# Patient Record
Sex: FEMALE | Race: WHITE | HISPANIC OR LATINO | URBAN - METROPOLITAN AREA
[De-identification: names, ages, dates, MRNs, and addresses within clinical notes are randomized per-mention and may not be internally consistent; named-entity substitution may affect disease eponyms.]

---

## 2018-05-23 ENCOUNTER — EMERGENCY (EMERGENCY)
Facility: HOSPITAL | Age: 28
LOS: 1 days | Discharge: HOME | End: 2018-05-23
Attending: EMERGENCY MEDICINE
Payer: COMMERCIAL

## 2018-05-23 VITALS
RESPIRATION RATE: 18 BRPM | DIASTOLIC BLOOD PRESSURE: 90 MMHG | HEART RATE: 93 BPM | TEMPERATURE: 98 F | SYSTOLIC BLOOD PRESSURE: 133 MMHG | OXYGEN SATURATION: 99 %

## 2018-05-23 VITALS — HEIGHT: 63 IN | WEIGHT: 111.99 LBS

## 2018-05-23 LAB
ALBUMIN SERPL ELPH-MCNC: 4.7 G/DL — SIGNIFICANT CHANGE UP (ref 3.3–5.2)
ALP SERPL-CCNC: 82 U/L — SIGNIFICANT CHANGE UP (ref 40–120)
ALT FLD-CCNC: 14 U/L — SIGNIFICANT CHANGE UP
ANION GAP SERPL CALC-SCNC: 16 MMOL/L — SIGNIFICANT CHANGE UP (ref 5–17)
AST SERPL-CCNC: 17 U/L — SIGNIFICANT CHANGE UP
BASOPHILS # BLD AUTO: 0 K/UL — SIGNIFICANT CHANGE UP (ref 0–0.2)
BASOPHILS NFR BLD AUTO: 0.4 % — SIGNIFICANT CHANGE UP (ref 0–2)
BILIRUB SERPL-MCNC: 0.3 MG/DL — LOW (ref 0.4–2)
BUN SERPL-MCNC: 11 MG/DL — SIGNIFICANT CHANGE UP (ref 8–20)
CALCIUM SERPL-MCNC: 9.7 MG/DL — SIGNIFICANT CHANGE UP (ref 8.6–10.2)
CHLORIDE SERPL-SCNC: 100 MMOL/L — SIGNIFICANT CHANGE UP (ref 98–107)
CO2 SERPL-SCNC: 24 MMOL/L — SIGNIFICANT CHANGE UP (ref 22–29)
CREAT SERPL-MCNC: 0.66 MG/DL — SIGNIFICANT CHANGE UP (ref 0.5–1.3)
EOSINOPHIL # BLD AUTO: 0.1 K/UL — SIGNIFICANT CHANGE UP (ref 0–0.5)
EOSINOPHIL NFR BLD AUTO: 1.4 % — SIGNIFICANT CHANGE UP (ref 0–6)
GLUCOSE SERPL-MCNC: 145 MG/DL — HIGH (ref 70–115)
HCT VFR BLD CALC: 40.7 % — SIGNIFICANT CHANGE UP (ref 37–47)
HGB BLD-MCNC: 13.6 G/DL — SIGNIFICANT CHANGE UP (ref 12–16)
LYMPHOCYTES # BLD AUTO: 1.6 K/UL — SIGNIFICANT CHANGE UP (ref 1–4.8)
LYMPHOCYTES # BLD AUTO: 29.1 % — SIGNIFICANT CHANGE UP (ref 20–55)
MCHC RBC-ENTMCNC: 31 PG — SIGNIFICANT CHANGE UP (ref 27–31)
MCHC RBC-ENTMCNC: 33.4 G/DL — SIGNIFICANT CHANGE UP (ref 32–36)
MCV RBC AUTO: 92.7 FL — SIGNIFICANT CHANGE UP (ref 81–99)
MONOCYTES # BLD AUTO: 0.4 K/UL — SIGNIFICANT CHANGE UP (ref 0–0.8)
MONOCYTES NFR BLD AUTO: 6.9 % — SIGNIFICANT CHANGE UP (ref 3–10)
NEUTROPHILS # BLD AUTO: 3.4 K/UL — SIGNIFICANT CHANGE UP (ref 1.8–8)
NEUTROPHILS NFR BLD AUTO: 61.8 % — SIGNIFICANT CHANGE UP (ref 37–73)
PLATELET # BLD AUTO: 247 K/UL — SIGNIFICANT CHANGE UP (ref 150–400)
POTASSIUM SERPL-MCNC: 4.5 MMOL/L — SIGNIFICANT CHANGE UP (ref 3.5–5.3)
POTASSIUM SERPL-SCNC: 4.5 MMOL/L — SIGNIFICANT CHANGE UP (ref 3.5–5.3)
PROT SERPL-MCNC: 8.4 G/DL — SIGNIFICANT CHANGE UP (ref 6.6–8.7)
RBC # BLD: 4.39 M/UL — LOW (ref 4.4–5.2)
RBC # FLD: 11.6 % — SIGNIFICANT CHANGE UP (ref 11–15.6)
SODIUM SERPL-SCNC: 140 MMOL/L — SIGNIFICANT CHANGE UP (ref 135–145)
WBC # BLD: 5.5 K/UL — SIGNIFICANT CHANGE UP (ref 4.8–10.8)
WBC # FLD AUTO: 5.5 K/UL — SIGNIFICANT CHANGE UP (ref 4.8–10.8)

## 2018-05-23 PROCEDURE — 85027 COMPLETE CBC AUTOMATED: CPT

## 2018-05-23 PROCEDURE — 80053 COMPREHEN METABOLIC PANEL: CPT

## 2018-05-23 PROCEDURE — 36415 COLL VENOUS BLD VENIPUNCTURE: CPT

## 2018-05-23 PROCEDURE — 96375 TX/PRO/DX INJ NEW DRUG ADDON: CPT

## 2018-05-23 PROCEDURE — 99284 EMERGENCY DEPT VISIT MOD MDM: CPT

## 2018-05-23 PROCEDURE — 96374 THER/PROPH/DIAG INJ IV PUSH: CPT

## 2018-05-23 PROCEDURE — 70486 CT MAXILLOFACIAL W/O DYE: CPT | Mod: 26

## 2018-05-23 PROCEDURE — 70486 CT MAXILLOFACIAL W/O DYE: CPT

## 2018-05-23 PROCEDURE — 99284 EMERGENCY DEPT VISIT MOD MDM: CPT | Mod: 25

## 2018-05-23 RX ORDER — IBUPROFEN 200 MG
1 TABLET ORAL
Qty: 30 | Refills: 0
Start: 2018-05-23 | End: 2018-05-27

## 2018-05-23 RX ORDER — KETOROLAC TROMETHAMINE 30 MG/ML
15 SYRINGE (ML) INJECTION ONCE
Refills: 0 | Status: DISCONTINUED | OUTPATIENT
Start: 2018-05-23 | End: 2018-05-23

## 2018-05-23 RX ADMIN — Medication 100 MILLIGRAM(S): at 11:30

## 2018-05-23 RX ADMIN — Medication 15 MILLIGRAM(S): at 11:31

## 2018-05-23 NOTE — ED PROVIDER NOTE - OBJECTIVE STATEMENT
28 year old female with a past hx of cellulitis of the foot and eye requiring antibiotic treatment, coming in complaining of right eye swelling, redness and pain since Monday, went to [facility] was given Amoxicillin for what they said was a preorbital cellulites, pt has taken only 1 day of antibiotic, states that the pain was mostly on the superior aspect of the eye, now states that the pain feels deeper in the eye and more inferiorly. Pt states that the swelling is increasing as well. denies blurry vision or double vision, but difficulty seeing secondary to swelling. pt denies FB sensation or trauma to the eye. has some crusting to the eye but states that she was trying to clean it but hurts to touch the eye . Pt does wear corrective eye lenses. Denies fever or chills, N/V/D/ abdominal pains, CP/SOb.

## 2018-05-23 NOTE — ED PROVIDER NOTE - CARE PLAN
Principal Discharge DX:	Cellulitis of eyelid, right  Assessment and plan of treatment:	18 year old female with redness, swelling and pain of the right eye.   ? periorbital vs orbital cellulitis.  labs, CT orbit and sinus

## 2018-05-23 NOTE — ED ADULT NURSE NOTE - OBJECTIVE STATEMENT
pt presents to ED with redness/swelling and pain to right orbital. pt states she was seen by her PMD yesterday and was told she has cellulitis to eye. pt given antibiotics with no relief. pt states the swelling and pain worsened since yesterday. afebrile. pt unable to open eye secondary to swelling.  a&ox3. breathing is even and unlabored. will contineu to monitor and reassess

## 2018-05-23 NOTE — ED PROVIDER NOTE - ATTENDING CONTRIBUTION TO CARE
seen with acp  c/o right eyelid erythema with swelling and redness no fever no chills no nausea no vomiting hx of cellulitis   PE marked swelling right eyelid   with pain  ct scan to r/o septal cellulitis and sinusitis were negative  started on clindamycin  patient improved while here  Agree with acps  assessment hx and physical

## 2018-05-23 NOTE — ED PROVIDER NOTE - PLAN OF CARE
18 year old female with redness, swelling and pain of the right eye.   ? periorbital vs orbital cellulitis.  labs, CT orbit and sinus

## 2018-05-23 NOTE — ED PROVIDER NOTE - CONSTITUTIONAL, MLM
normal... Well appearing, well nourished, awake, alert, oriented to person, place, time/situation and in no apparent physical or respiratory distress.

## 2018-05-23 NOTE — ED PROVIDER NOTE - PROGRESS NOTE DETAILS
pt received clindamycin IV instructed to continue to take clindamycin PO and FU with optho and PMD. educated if symptoms worsen to return to the ER  EYE swelling decreased significantlY!

## 2018-05-23 NOTE — ED PROVIDER NOTE - PHYSICAL EXAMINATION
RIGHT EYE: superior lid swelling and erythema. + TTP over superior lid and inferior orbit. no crepitus.  90 % closed. eye lid crusting.  sclera injected. GEGE. EOMI-pain with left lateral movement. no nystagmus.

## 2018-07-20 ENCOUNTER — APPOINTMENT (OUTPATIENT)
Dept: ANTEPARTUM | Facility: CLINIC | Age: 28
End: 2018-07-20

## 2018-07-20 ENCOUNTER — APPOINTMENT (OUTPATIENT)
Dept: MATERNAL FETAL MEDICINE | Facility: CLINIC | Age: 28
End: 2018-07-20

## 2018-07-24 ENCOUNTER — MED ADMIN CHARGE (OUTPATIENT)
Age: 28
End: 2018-07-24

## 2018-07-31 PROBLEM — L03.119 CELLULITIS, LEG: Status: RESOLVED | Noted: 2018-07-31 | Resolved: 2018-07-31

## 2018-07-31 PROBLEM — Z81.8 FAMILY HISTORY OF BIPOLAR DISORDER: Status: ACTIVE | Noted: 2018-07-31

## 2018-07-31 PROBLEM — Z83.49 FAMILY HISTORY OF HYPOTHYROIDISM: Status: ACTIVE | Noted: 2018-07-31

## 2018-07-31 PROBLEM — Z83.3 FAMILY HISTORY OF DIABETES MELLITUS: Status: ACTIVE | Noted: 2018-07-31

## 2018-08-01 ENCOUNTER — APPOINTMENT (OUTPATIENT)
Dept: ANTEPARTUM | Facility: CLINIC | Age: 28
End: 2018-08-01

## 2018-08-01 ENCOUNTER — ASOB RESULT (OUTPATIENT)
Age: 28
End: 2018-08-01

## 2018-08-01 ENCOUNTER — APPOINTMENT (OUTPATIENT)
Dept: MATERNAL FETAL MEDICINE | Facility: CLINIC | Age: 28
End: 2018-08-01
Payer: COMMERCIAL

## 2018-08-01 ENCOUNTER — APPOINTMENT (OUTPATIENT)
Dept: ANTEPARTUM | Facility: CLINIC | Age: 28
End: 2018-08-01
Payer: COMMERCIAL

## 2018-08-01 VITALS
DIASTOLIC BLOOD PRESSURE: 68 MMHG | RESPIRATION RATE: 16 BRPM | WEIGHT: 117.25 LBS | SYSTOLIC BLOOD PRESSURE: 104 MMHG | HEART RATE: 83 BPM | HEIGHT: 63 IN | OXYGEN SATURATION: 99 % | BODY MASS INDEX: 20.77 KG/M2

## 2018-08-01 DIAGNOSIS — Z82.49 FAMILY HISTORY OF ISCHEMIC HEART DISEASE AND OTHER DISEASES OF THE CIRCULATORY SYSTEM: ICD-10-CM

## 2018-08-01 DIAGNOSIS — L03.119 CELLULITIS OF UNSPECIFIED PART OF LIMB: ICD-10-CM

## 2018-08-01 DIAGNOSIS — Z87.42 PERSONAL HISTORY OF OTHER DISEASES OF THE FEMALE GENITAL TRACT: ICD-10-CM

## 2018-08-01 DIAGNOSIS — Z83.49 FAMILY HISTORY OF OTHER ENDOCRINE, NUTRITIONAL AND METABOLIC DISEASES: ICD-10-CM

## 2018-08-01 DIAGNOSIS — Z83.3 FAMILY HISTORY OF DIABETES MELLITUS: ICD-10-CM

## 2018-08-01 DIAGNOSIS — Z81.8 FAMILY HISTORY OF OTHER MENTAL AND BEHAVIORAL DISORDERS: ICD-10-CM

## 2018-08-01 DIAGNOSIS — N39.0 URINARY TRACT INFECTION, SITE NOT SPECIFIED: ICD-10-CM

## 2018-08-01 DIAGNOSIS — Z80.8 FAMILY HISTORY OF MALIGNANT NEOPLASM OF OTHER ORGANS OR SYSTEMS: ICD-10-CM

## 2018-08-01 PROCEDURE — 99242 OFF/OP CONSLTJ NEW/EST SF 20: CPT

## 2018-08-01 PROCEDURE — 76817 TRANSVAGINAL US OBSTETRIC: CPT

## 2018-08-08 ENCOUNTER — LABORATORY RESULT (OUTPATIENT)
Age: 28
End: 2018-08-08

## 2018-11-22 NOTE — ED ADULT NURSE NOTE - HOW MANY DRINKS CONTAINING ALCOHOL DO YOU HAVE ON A TYPICAL DAY WHEN YOU ARE DRINKING?
1 or 2 On reassessment, pt sleeping comfortably; no wheezing, no throat swelling, no more hives. Symptoms completely resolved. Will d/c with rx for epi pen and PCP fu. Discussed indications for patient return to ED. Patient understood.

## 2018-12-19 ENCOUNTER — APPOINTMENT (OUTPATIENT)
Dept: MATERNAL FETAL MEDICINE | Facility: CLINIC | Age: 28
End: 2018-12-19
Payer: COMMERCIAL

## 2018-12-19 ENCOUNTER — ASOB RESULT (OUTPATIENT)
Age: 28
End: 2018-12-19

## 2018-12-19 VITALS — WEIGHT: 141 LBS | BODY MASS INDEX: 24.98 KG/M2 | HEIGHT: 63 IN

## 2018-12-19 DIAGNOSIS — Z83.3 FAMILY HISTORY OF DIABETES MELLITUS: ICD-10-CM

## 2018-12-19 PROCEDURE — G0108 DIAB MANAGE TRN  PER INDIV: CPT

## 2018-12-20 ENCOUNTER — OUTPATIENT (OUTPATIENT)
Dept: OUTPATIENT SERVICES | Facility: HOSPITAL | Age: 28
LOS: 1 days | End: 2018-12-20
Payer: COMMERCIAL

## 2018-12-20 VITALS
DIASTOLIC BLOOD PRESSURE: 62 MMHG | TEMPERATURE: 97 F | HEART RATE: 84 BPM | RESPIRATION RATE: 18 BRPM | SYSTOLIC BLOOD PRESSURE: 100 MMHG

## 2018-12-20 DIAGNOSIS — O47.03 FALSE LABOR BEFORE 37 COMPLETED WEEKS OF GESTATION, THIRD TRIMESTER: ICD-10-CM

## 2018-12-20 LAB
APPEARANCE UR: CLEAR — SIGNIFICANT CHANGE UP
BACTERIA # UR AUTO: ABNORMAL
BILIRUB UR-MCNC: NEGATIVE — SIGNIFICANT CHANGE UP
COLOR SPEC: YELLOW — SIGNIFICANT CHANGE UP
DIFF PNL FLD: ABNORMAL
EPI CELLS # UR: SIGNIFICANT CHANGE UP
FIBRONECTIN FETAL SPEC QL: NEGATIVE — SIGNIFICANT CHANGE UP
GLUCOSE UR QL: NEGATIVE MG/DL — SIGNIFICANT CHANGE UP
KETONES UR-MCNC: ABNORMAL
LEUKOCYTE ESTERASE UR-ACNC: ABNORMAL
NITRITE UR-MCNC: NEGATIVE — SIGNIFICANT CHANGE UP
PH UR: 7 — SIGNIFICANT CHANGE UP (ref 5–8)
PROT UR-MCNC: 15 MG/DL
RBC CASTS # UR COMP ASSIST: ABNORMAL /HPF (ref 0–4)
SP GR SPEC: 1.01 — SIGNIFICANT CHANGE UP (ref 1.01–1.02)
UROBILINOGEN FLD QL: NEGATIVE MG/DL — SIGNIFICANT CHANGE UP
WBC UR QL: ABNORMAL

## 2018-12-20 PROCEDURE — 76817 TRANSVAGINAL US OBSTETRIC: CPT

## 2018-12-20 PROCEDURE — 87086 URINE CULTURE/COLONY COUNT: CPT

## 2018-12-20 PROCEDURE — 76819 FETAL BIOPHYS PROFIL W/O NST: CPT

## 2018-12-20 PROCEDURE — 76805 OB US >/= 14 WKS SNGL FETUS: CPT | Mod: 26

## 2018-12-20 PROCEDURE — 36415 COLL VENOUS BLD VENIPUNCTURE: CPT

## 2018-12-20 PROCEDURE — 87800 DETECT AGNT MULT DNA DIREC: CPT

## 2018-12-20 PROCEDURE — 93975 VASCULAR STUDY: CPT

## 2018-12-20 PROCEDURE — 59025 FETAL NON-STRESS TEST: CPT

## 2018-12-20 PROCEDURE — 76819 FETAL BIOPHYS PROFIL W/O NST: CPT | Mod: 26

## 2018-12-20 PROCEDURE — 87591 N.GONORRHOEAE DNA AMP PROB: CPT

## 2018-12-20 PROCEDURE — 81001 URINALYSIS AUTO W/SCOPE: CPT

## 2018-12-20 PROCEDURE — G0463: CPT

## 2018-12-20 PROCEDURE — 76817 TRANSVAGINAL US OBSTETRIC: CPT | Mod: 26

## 2018-12-20 PROCEDURE — 87081 CULTURE SCREEN ONLY: CPT

## 2018-12-20 PROCEDURE — 87491 CHLMYD TRACH DNA AMP PROBE: CPT

## 2018-12-20 PROCEDURE — 82731 ASSAY OF FETAL FIBRONECTIN: CPT

## 2018-12-20 PROCEDURE — 76820 UMBILICAL ARTERY ECHO: CPT | Mod: 26

## 2018-12-20 PROCEDURE — 99214 OFFICE O/P EST MOD 30 MIN: CPT

## 2018-12-20 PROCEDURE — 76805 OB US >/= 14 WKS SNGL FETUS: CPT

## 2018-12-20 NOTE — OB PROVIDER TRIAGE NOTE - NSOBPROVIDERNOTE_OBGYN_ALL_OB_FT
29 y/o  at 30w5d being evaluated for  labor.   - UA, UCx, Affirm, GC/Chlamydia, FFN, GBS  - will monitor FHT/[place] and reassess for cervical change and contraction pattern  - PO hydration    d/w Dr. Kim 29 y/o  at 30w5d being evaluated for  labor.   - UA, UCx, Affirm, GC/Chlamydia, FFN, GBS  - SSE showed mild leukorrhea SVE was closed, thick, and posterior  - will monitor FHT/[place] and reassess for cervical change and contraction pattern  - PO hydration    d/w Dr. Kim 29 y/o  at 30w5d being evaluated for  labor.   - PO hydration   - UA, UCx, Affirm, GC/Chlamydia, FFN, GBS  - SSE showed mild leukorrhea SVE was closed, thick, and posterior on initial check and reassessment 4 hours later  - FHT reactive and Cat I  - emir irregularly but spacing out and patient is reporting feeling them a lot less  Discharged patient home with labor precautions. She will follow up in the office tomorrow morning at her next appointment.   Treatment will be initiated outpatient pending any abnormal results on cultures drawn.     d/w Dr. Kim

## 2018-12-20 NOTE — OB PROVIDER TRIAGE NOTE - HISTORY OF PRESENT ILLNESS
29 y/o  at 30w5d presenting to labor and delivery with a 3 hour history of severe intermittent lower abdominal pain radiating to the back, 7/10. Denies vaginal bleeding and leakage of fluid. +FM   Prenatal course significant for recently diagnosed GDMA1.     ObGynHx: none  PMH/PSH: none  Meds: PNV  Allergies: shrimp

## 2018-12-20 NOTE — OB RN TRIAGE NOTE - CHIEF COMPLAINT QUOTE
I woke up with a lot of abdominal pain and it goes around to my back. My lower abdomen hurts to touch

## 2018-12-20 NOTE — OB PROVIDER TRIAGE NOTE - NSHPPHYSICALEXAM_GEN_ALL_CORE
Vital Signs Last 24 Hrs  T(F): 97.3 (20 Dec 2018 07:48), Max: 97.3 (20 Dec 2018 07:48)  HR: 84 (20 Dec 2018 07:48) (84 - 84)  BP: 100/62 (20 Dec 2018 07:48) (100/62 - 100/62)  RR: 18 (20 Dec 2018 07:48) (18 - 18)    Gen: moderate discomfort   Abdomen: gravid   FHT: baseline 130, moderate variability, +accels, no decels   [place]: irregular contractions q4-8m   SSE: mild leukorrha, no pooling, no bleeding Vital Signs Last 24 Hrs  T(F): 97.3 (20 Dec 2018 07:48), Max: 97.3 (20 Dec 2018 07:48)  HR: 84 (20 Dec 2018 07:48) (84 - 84)  BP: 100/62 (20 Dec 2018 07:48) (100/62 - 100/62)  RR: 18 (20 Dec 2018 07:48) (18 - 18)    Gen: moderate discomfort   Abdomen: gravid   FHT: baseline 130, moderate variability, +accels, no decels   [place]: irregular contractions q4-8m   SVE: closed, thick, posterior  SSE: mild leukorrha, no pooling, no bleeding

## 2018-12-21 ENCOUNTER — APPOINTMENT (OUTPATIENT)
Dept: OBGYN | Facility: CLINIC | Age: 28
End: 2018-12-21
Payer: COMMERCIAL

## 2018-12-21 ENCOUNTER — OUTPATIENT (OUTPATIENT)
Dept: OUTPATIENT SERVICES | Facility: HOSPITAL | Age: 28
LOS: 1 days | End: 2018-12-21
Payer: COMMERCIAL

## 2018-12-21 ENCOUNTER — ASOB RESULT (OUTPATIENT)
Age: 28
End: 2018-12-21

## 2018-12-21 VITALS
RESPIRATION RATE: 16 BRPM | TEMPERATURE: 99 F | SYSTOLIC BLOOD PRESSURE: 110 MMHG | DIASTOLIC BLOOD PRESSURE: 65 MMHG | HEART RATE: 90 BPM

## 2018-12-21 VITALS — TEMPERATURE: 98 F | RESPIRATION RATE: 16 BRPM

## 2018-12-21 DIAGNOSIS — O47.03 FALSE LABOR BEFORE 37 COMPLETED WEEKS OF GESTATION, THIRD TRIMESTER: ICD-10-CM

## 2018-12-21 LAB
C TRACH RRNA SPEC QL NAA+PROBE: SIGNIFICANT CHANGE UP
CULTURE RESULTS: NO GROWTH — SIGNIFICANT CHANGE UP
N GONORRHOEA RRNA SPEC QL NAA+PROBE: SIGNIFICANT CHANGE UP
SPECIMEN SOURCE: SIGNIFICANT CHANGE UP
SPECIMEN SOURCE: SIGNIFICANT CHANGE UP

## 2018-12-21 PROCEDURE — 76817 TRANSVAGINAL US OBSTETRIC: CPT

## 2018-12-21 PROCEDURE — 59025 FETAL NON-STRESS TEST: CPT | Mod: 59

## 2018-12-21 PROCEDURE — 76816 OB US FOLLOW-UP PER FETUS: CPT

## 2018-12-21 PROCEDURE — 0502F SUBSEQUENT PRENATAL CARE: CPT

## 2018-12-21 RX ORDER — SODIUM CHLORIDE 9 MG/ML
1000 INJECTION, SOLUTION INTRAVENOUS ONCE
Refills: 0 | Status: COMPLETED | OUTPATIENT
Start: 2018-12-21 | End: 2018-12-21

## 2018-12-21 RX ADMIN — SODIUM CHLORIDE 1000 MILLILITER(S): 9 INJECTION, SOLUTION INTRAVENOUS at 14:57

## 2018-12-21 NOTE — OB PROVIDER TRIAGE NOTE - HISTORY OF PRESENT ILLNESS
Pt is 29yo  at 30 6/7 by LMP 18 was sent to L&D triage from her OB office for contractions on the monitor that the pt does not feel.  Pt is feeling well overall, denies abdominal pain/cramping LOF, VB, HA, N/V.  Pt was in L&D triage yesterday for painful contractions 7/10 was given IV hydration - pain and contractions subsided.  Pt underwent ultrasound yesterday and today. BPP 8/8; TIARA 13.1; Cervix 4.04 cm. FFN from yesterday negative; urine cultures - negative.    Obj: comfortable, A&O x3  ICU Vital Signs Last 24 Hrs  T(C): 37 (21 Dec 2018 14:09), Max: 37 (21 Dec 2018 14:09)  T(F): 98.6 (21 Dec 2018 14:09), Max: 98.6 (21 Dec 2018 14:09)  HR: 90 (21 Dec 2018 14:16) (90 - 90)  BP: 110/65 (21 Dec 2018 14:16) (110/65 - 110/65)  RR: 16 (21 Dec 2018 14:09) (16 - 16)    TOCO: no contraction  FHT: cat1    A/P 29yo  at 30 6/7 r/o  labor  -FFN negative  -no contractions  - cervical length 4cm    IV hydration recommended.  d/w Dr. Robins

## 2018-12-21 NOTE — OB PROVIDER TRIAGE NOTE - NSOBPROVIDERNOTE_OBGYN_ALL_OB_FT
Patient was seen   feeling well   denies feeling contractions  FFN negative   cervical length 4 cm on sonogram   SVE closed /long   will d/c home with PTL precautions

## 2018-12-22 LAB
CANDIDA AB TITR SER: SIGNIFICANT CHANGE UP
CULTURE RESULTS: SIGNIFICANT CHANGE UP
G VAGINALIS DNA SPEC QL NAA+PROBE: DETECTED
SPECIMEN SOURCE: SIGNIFICANT CHANGE UP
T VAGINALIS SPEC QL WET PREP: SIGNIFICANT CHANGE UP

## 2018-12-26 ENCOUNTER — APPOINTMENT (OUTPATIENT)
Dept: MATERNAL FETAL MEDICINE | Facility: CLINIC | Age: 28
End: 2018-12-26
Payer: COMMERCIAL

## 2018-12-26 ENCOUNTER — ASOB RESULT (OUTPATIENT)
Age: 28
End: 2018-12-26

## 2018-12-26 ENCOUNTER — OTHER (OUTPATIENT)
Age: 28
End: 2018-12-26

## 2018-12-26 VITALS — WEIGHT: 138.5 LBS | HEIGHT: 63 IN | BODY MASS INDEX: 24.54 KG/M2

## 2018-12-26 PROCEDURE — G0108 DIAB MANAGE TRN  PER INDIV: CPT

## 2019-01-07 ENCOUNTER — APPOINTMENT (OUTPATIENT)
Dept: ANTEPARTUM | Facility: CLINIC | Age: 29
End: 2019-01-07
Payer: COMMERCIAL

## 2019-01-07 ENCOUNTER — ASOB RESULT (OUTPATIENT)
Age: 29
End: 2019-01-07

## 2019-01-07 ENCOUNTER — APPOINTMENT (OUTPATIENT)
Dept: MATERNAL FETAL MEDICINE | Facility: CLINIC | Age: 29
End: 2019-01-07
Payer: COMMERCIAL

## 2019-01-07 VITALS
BODY MASS INDEX: 25.16 KG/M2 | DIASTOLIC BLOOD PRESSURE: 62 MMHG | HEIGHT: 63 IN | WEIGHT: 142 LBS | SYSTOLIC BLOOD PRESSURE: 110 MMHG

## 2019-01-07 PROCEDURE — 76816 OB US FOLLOW-UP PER FETUS: CPT

## 2019-01-07 PROCEDURE — 76819 FETAL BIOPHYS PROFIL W/O NST: CPT

## 2019-01-07 PROCEDURE — 76820 UMBILICAL ARTERY ECHO: CPT

## 2019-01-07 PROCEDURE — 99244 OFF/OP CNSLTJ NEW/EST MOD 40: CPT

## 2019-01-07 PROCEDURE — 93976 VASCULAR STUDY: CPT

## 2019-01-08 ENCOUNTER — OTHER (OUTPATIENT)
Age: 29
End: 2019-01-08

## 2019-01-08 ENCOUNTER — APPOINTMENT (OUTPATIENT)
Dept: OBGYN | Facility: CLINIC | Age: 29
End: 2019-01-08

## 2019-01-15 ENCOUNTER — APPOINTMENT (OUTPATIENT)
Dept: OBGYN | Facility: CLINIC | Age: 29
End: 2019-01-15
Payer: COMMERCIAL

## 2019-01-15 ENCOUNTER — MED ADMIN CHARGE (OUTPATIENT)
Age: 29
End: 2019-01-15

## 2019-01-15 VITALS
DIASTOLIC BLOOD PRESSURE: 62 MMHG | BODY MASS INDEX: 25.71 KG/M2 | HEIGHT: 63 IN | SYSTOLIC BLOOD PRESSURE: 110 MMHG | WEIGHT: 145.13 LBS

## 2019-01-15 DIAGNOSIS — Z00.00 ENCOUNTER FOR GENERAL ADULT MEDICAL EXAMINATION W/OUT ABNORMAL FINDINGS: ICD-10-CM

## 2019-01-15 LAB
BILIRUB UR QL STRIP: NORMAL
CLARITY UR: CLEAR
COLLECTION METHOD: NORMAL
GLUCOSE UR-MCNC: NORMAL
HCG UR QL: 0.2 EU/DL
HGB UR QL STRIP.AUTO: NORMAL
KETONES UR-MCNC: NORMAL
LEUKOCYTE ESTERASE UR QL STRIP: ABNORMAL
NITRITE UR QL STRIP: NORMAL
PH UR STRIP: 5.5
PROT UR STRIP-MCNC: NORMAL
SP GR UR STRIP: 1

## 2019-01-15 PROCEDURE — 81003 URINALYSIS AUTO W/O SCOPE: CPT | Mod: QW

## 2019-01-15 PROCEDURE — 0502F SUBSEQUENT PRENATAL CARE: CPT

## 2019-01-15 PROCEDURE — 90471 IMMUNIZATION ADMIN: CPT

## 2019-01-15 PROCEDURE — 90715 TDAP VACCINE 7 YRS/> IM: CPT

## 2019-01-23 ENCOUNTER — RECORD ABSTRACTING (OUTPATIENT)
Age: 29
End: 2019-01-23

## 2019-01-23 ENCOUNTER — APPOINTMENT (OUTPATIENT)
Dept: OBGYN | Facility: CLINIC | Age: 29
End: 2019-01-23
Payer: COMMERCIAL

## 2019-01-23 VITALS
DIASTOLIC BLOOD PRESSURE: 60 MMHG | SYSTOLIC BLOOD PRESSURE: 102 MMHG | BODY MASS INDEX: 25.52 KG/M2 | HEIGHT: 63 IN | WEIGHT: 144 LBS

## 2019-01-23 DIAGNOSIS — Z83.49 FAMILY HISTORY OF OTHER ENDOCRINE, NUTRITIONAL AND METABOLIC DISEASES: ICD-10-CM

## 2019-01-23 DIAGNOSIS — Z82.79 FAMILY HISTORY OF OTHER CONGENITAL MALFORMATIONS, DEFORMATIONS AND CHROMOSOMAL ABNORMALITIES: ICD-10-CM

## 2019-01-23 LAB — CYTOLOGY CVX/VAG DOC THIN PREP: NORMAL

## 2019-01-23 PROCEDURE — 0502F SUBSEQUENT PRENATAL CARE: CPT

## 2019-01-26 ENCOUNTER — APPOINTMENT (OUTPATIENT)
Dept: MATERNAL FETAL MEDICINE | Facility: CLINIC | Age: 29
End: 2019-01-26
Payer: COMMERCIAL

## 2019-01-26 ENCOUNTER — ASOB RESULT (OUTPATIENT)
Age: 29
End: 2019-01-26

## 2019-01-26 VITALS — WEIGHT: 141.19 LBS | HEIGHT: 63 IN | BODY MASS INDEX: 25.02 KG/M2

## 2019-01-26 PROCEDURE — G0108 DIAB MANAGE TRN  PER INDIV: CPT

## 2019-01-28 ENCOUNTER — ASOB RESULT (OUTPATIENT)
Age: 29
End: 2019-01-28

## 2019-01-28 ENCOUNTER — APPOINTMENT (OUTPATIENT)
Dept: ANTEPARTUM | Facility: CLINIC | Age: 29
End: 2019-01-28
Payer: COMMERCIAL

## 2019-01-28 PROCEDURE — 76820 UMBILICAL ARTERY ECHO: CPT

## 2019-01-28 PROCEDURE — 76818 FETAL BIOPHYS PROFILE W/NST: CPT

## 2019-01-30 ENCOUNTER — APPOINTMENT (OUTPATIENT)
Dept: OBGYN | Facility: CLINIC | Age: 29
End: 2019-01-30
Payer: COMMERCIAL

## 2019-01-30 VITALS
DIASTOLIC BLOOD PRESSURE: 70 MMHG | WEIGHT: 142 LBS | BODY MASS INDEX: 25.16 KG/M2 | HEIGHT: 63 IN | SYSTOLIC BLOOD PRESSURE: 110 MMHG

## 2019-01-30 LAB
BILIRUB UR QL STRIP: NORMAL
GLUCOSE UR-MCNC: NORMAL
HGB UR QL STRIP.AUTO: ABNORMAL
KETONES UR-MCNC: NORMAL
LEUKOCYTE ESTERASE UR QL STRIP: ABNORMAL
NITRITE UR QL STRIP: NORMAL
PROT UR STRIP-MCNC: ABNORMAL

## 2019-01-30 PROCEDURE — 0502F SUBSEQUENT PRENATAL CARE: CPT

## 2019-01-30 PROCEDURE — 36415 COLL VENOUS BLD VENIPUNCTURE: CPT

## 2019-02-04 ENCOUNTER — OTHER (OUTPATIENT)
Age: 29
End: 2019-02-04

## 2019-02-04 ENCOUNTER — APPOINTMENT (OUTPATIENT)
Dept: OBGYN | Facility: CLINIC | Age: 29
End: 2019-02-04
Payer: COMMERCIAL

## 2019-02-04 VITALS
SYSTOLIC BLOOD PRESSURE: 114 MMHG | BODY MASS INDEX: 25.87 KG/M2 | HEIGHT: 63 IN | DIASTOLIC BLOOD PRESSURE: 68 MMHG | WEIGHT: 146 LBS

## 2019-02-04 LAB
B-HEM STREP SPEC QL CULT: ABNORMAL
HIV1+2 AB SPEC QL IA.RAPID: NONREACTIVE

## 2019-02-04 PROCEDURE — 0502F SUBSEQUENT PRENATAL CARE: CPT

## 2019-02-04 PROCEDURE — 81003 URINALYSIS AUTO W/O SCOPE: CPT | Mod: QW

## 2019-02-04 RX ORDER — TOBRAMYCIN AND DEXAMETHASONE 1; 3 MG/ML; MG/ML
0.3-0.1 SUSPENSION/ DROPS OPHTHALMIC
Refills: 0 | Status: DISCONTINUED | COMMUNITY
End: 2019-02-04

## 2019-02-04 RX ORDER — METRONIDAZOLE 7.5 MG/G
0.75 GEL VAGINAL
Qty: 1 | Refills: 0 | Status: DISCONTINUED | COMMUNITY
Start: 2018-12-26 | End: 2019-02-04

## 2019-02-05 ENCOUNTER — APPOINTMENT (OUTPATIENT)
Dept: ANTEPARTUM | Facility: CLINIC | Age: 29
End: 2019-02-05
Payer: COMMERCIAL

## 2019-02-05 ENCOUNTER — ASOB RESULT (OUTPATIENT)
Age: 29
End: 2019-02-05

## 2019-02-05 PROCEDURE — 76816 OB US FOLLOW-UP PER FETUS: CPT

## 2019-02-05 PROCEDURE — 93976 VASCULAR STUDY: CPT

## 2019-02-05 PROCEDURE — 76820 UMBILICAL ARTERY ECHO: CPT

## 2019-02-05 PROCEDURE — 76819 FETAL BIOPHYS PROFIL W/O NST: CPT

## 2019-02-11 ENCOUNTER — ASOB RESULT (OUTPATIENT)
Age: 29
End: 2019-02-11

## 2019-02-11 ENCOUNTER — APPOINTMENT (OUTPATIENT)
Dept: ANTEPARTUM | Facility: CLINIC | Age: 29
End: 2019-02-11
Payer: COMMERCIAL

## 2019-02-11 ENCOUNTER — APPOINTMENT (OUTPATIENT)
Dept: MATERNAL FETAL MEDICINE | Facility: CLINIC | Age: 29
End: 2019-02-11
Payer: COMMERCIAL

## 2019-02-11 VITALS
BODY MASS INDEX: 26.22 KG/M2 | HEIGHT: 63 IN | DIASTOLIC BLOOD PRESSURE: 76 MMHG | SYSTOLIC BLOOD PRESSURE: 118 MMHG | WEIGHT: 148 LBS

## 2019-02-11 PROCEDURE — 76818 FETAL BIOPHYS PROFILE W/NST: CPT

## 2019-02-11 PROCEDURE — 76820 UMBILICAL ARTERY ECHO: CPT

## 2019-02-11 PROCEDURE — 99244 OFF/OP CNSLTJ NEW/EST MOD 40: CPT

## 2019-02-13 ENCOUNTER — APPOINTMENT (OUTPATIENT)
Dept: OBGYN | Facility: CLINIC | Age: 29
End: 2019-02-13
Payer: COMMERCIAL

## 2019-02-13 LAB
BILIRUB UR QL STRIP: NORMAL
BILIRUB UR QL STRIP: NORMAL
COLLECTION METHOD: NORMAL
GLUCOSE UR-MCNC: NORMAL
GLUCOSE UR-MCNC: NORMAL
HCG UR QL: 0.2 EU/DL
HCG UR QL: 0.2 EU/DL
HGB UR QL STRIP.AUTO: NORMAL
HGB UR QL STRIP.AUTO: NORMAL
KETONES UR-MCNC: NORMAL
KETONES UR-MCNC: NORMAL
LEUKOCYTE ESTERASE UR QL STRIP: ABNORMAL
LEUKOCYTE ESTERASE UR QL STRIP: NORMAL
NITRITE UR QL STRIP: NORMAL
NITRITE UR QL STRIP: NORMAL
PH UR STRIP: 6
PH UR STRIP: 6.5
PROT UR STRIP-MCNC: NORMAL
PROT UR STRIP-MCNC: NORMAL
SP GR UR STRIP: 1.01
SP GR UR STRIP: 1.01

## 2019-02-13 PROCEDURE — 0502F SUBSEQUENT PRENATAL CARE: CPT

## 2019-02-18 ENCOUNTER — APPOINTMENT (OUTPATIENT)
Dept: ANTEPARTUM | Facility: CLINIC | Age: 29
End: 2019-02-18
Payer: COMMERCIAL

## 2019-02-18 ENCOUNTER — ASOB RESULT (OUTPATIENT)
Age: 29
End: 2019-02-18

## 2019-02-18 PROCEDURE — 76818 FETAL BIOPHYS PROFILE W/NST: CPT

## 2019-02-18 PROCEDURE — 93976 VASCULAR STUDY: CPT

## 2019-02-18 PROCEDURE — 76815 OB US LIMITED FETUS(S): CPT

## 2019-02-18 PROCEDURE — 76820 UMBILICAL ARTERY ECHO: CPT

## 2019-02-20 ENCOUNTER — APPOINTMENT (OUTPATIENT)
Dept: OBGYN | Facility: CLINIC | Age: 29
End: 2019-02-20
Payer: COMMERCIAL

## 2019-02-20 VITALS
DIASTOLIC BLOOD PRESSURE: 79 MMHG | BODY MASS INDEX: 25.69 KG/M2 | SYSTOLIC BLOOD PRESSURE: 120 MMHG | HEIGHT: 63 IN | WEIGHT: 145 LBS

## 2019-02-20 PROCEDURE — 0502F SUBSEQUENT PRENATAL CARE: CPT

## 2019-02-21 LAB
BILIRUB UR QL STRIP: NORMAL
GLUCOSE UR-MCNC: NORMAL
HCG UR QL: 0.2 EU/DL
HGB UR QL STRIP.AUTO: NORMAL
KETONES UR-MCNC: NORMAL
LEUKOCYTE ESTERASE UR QL STRIP: NORMAL
NITRITE UR QL STRIP: NORMAL
PH UR STRIP: 6.5
PROT UR STRIP-MCNC: NORMAL
SP GR UR STRIP: 1.01

## 2019-02-22 ENCOUNTER — INPATIENT (INPATIENT)
Facility: HOSPITAL | Age: 29
LOS: 1 days | Discharge: HOME | End: 2019-02-24
Attending: OBSTETRICS & GYNECOLOGY | Admitting: OBSTETRICS & GYNECOLOGY
Payer: COMMERCIAL

## 2019-02-22 ENCOUNTER — APPOINTMENT (OUTPATIENT)
Dept: OBGYN | Facility: HOSPITAL | Age: 29
End: 2019-02-22

## 2019-02-22 VITALS — HEART RATE: 93 BPM | SYSTOLIC BLOOD PRESSURE: 129 MMHG | DIASTOLIC BLOOD PRESSURE: 78 MMHG

## 2019-02-22 DIAGNOSIS — O47.1 FALSE LABOR AT OR AFTER 37 COMPLETED WEEKS OF GESTATION: ICD-10-CM

## 2019-02-22 DIAGNOSIS — O26.893 OTHER SPECIFIED PREGNANCY RELATED CONDITIONS, THIRD TRIMESTER: ICD-10-CM

## 2019-02-22 LAB
APPEARANCE UR: CLEAR — SIGNIFICANT CHANGE UP
BACTERIA # UR AUTO: NEGATIVE — SIGNIFICANT CHANGE UP
BASOPHILS # BLD AUTO: 0 K/UL — SIGNIFICANT CHANGE UP (ref 0–0.2)
BASOPHILS NFR BLD AUTO: 0.1 % — SIGNIFICANT CHANGE UP (ref 0–2)
BILIRUB UR-MCNC: NEGATIVE — SIGNIFICANT CHANGE UP
BLD GP AB SCN SERPL QL: SIGNIFICANT CHANGE UP
COLOR SPEC: YELLOW — SIGNIFICANT CHANGE UP
DIFF PNL FLD: ABNORMAL
DIR ANTIGLOB POLYSPECIFIC INTERPRETATION: SIGNIFICANT CHANGE UP
EOSINOPHIL # BLD AUTO: 0.1 K/UL — SIGNIFICANT CHANGE UP (ref 0–0.5)
EOSINOPHIL NFR BLD AUTO: 0.6 % — SIGNIFICANT CHANGE UP (ref 0–6)
EPI CELLS # UR: SIGNIFICANT CHANGE UP
GLUCOSE BLDC GLUCOMTR-MCNC: 122 MG/DL — HIGH (ref 70–99)
GLUCOSE BLDC GLUCOMTR-MCNC: 81 MG/DL — SIGNIFICANT CHANGE UP (ref 70–99)
GLUCOSE UR QL: NEGATIVE MG/DL — SIGNIFICANT CHANGE UP
HCT VFR BLD CALC: 37.2 % — SIGNIFICANT CHANGE UP (ref 37–47)
HGB BLD-MCNC: 12.8 G/DL — SIGNIFICANT CHANGE UP (ref 12–16)
KETONES UR-MCNC: NEGATIVE — SIGNIFICANT CHANGE UP
LEUKOCYTE ESTERASE UR-ACNC: NEGATIVE — SIGNIFICANT CHANGE UP
LYMPHOCYTES # BLD AUTO: 1.4 K/UL — SIGNIFICANT CHANGE UP (ref 1–4.8)
LYMPHOCYTES # BLD AUTO: 12 % — LOW (ref 20–55)
MCHC RBC-ENTMCNC: 31.4 PG — HIGH (ref 27–31)
MCHC RBC-ENTMCNC: 34.4 G/DL — SIGNIFICANT CHANGE UP (ref 32–36)
MCV RBC AUTO: 91.2 FL — SIGNIFICANT CHANGE UP (ref 81–99)
MONOCYTES # BLD AUTO: 0.9 K/UL — HIGH (ref 0–0.8)
MONOCYTES NFR BLD AUTO: 7.4 % — SIGNIFICANT CHANGE UP (ref 3–10)
NEUTROPHILS # BLD AUTO: 9.2 K/UL — HIGH (ref 1.8–8)
NEUTROPHILS NFR BLD AUTO: 79 % — HIGH (ref 37–73)
NITRITE UR-MCNC: NEGATIVE — SIGNIFICANT CHANGE UP
PH UR: 6 — SIGNIFICANT CHANGE UP (ref 5–8)
PLATELET # BLD AUTO: 221 K/UL — SIGNIFICANT CHANGE UP (ref 150–400)
PROT UR-MCNC: 30 MG/DL
RBC # BLD: 4.08 M/UL — LOW (ref 4.4–5.2)
RBC # FLD: 13.6 % — SIGNIFICANT CHANGE UP (ref 11–15.6)
RBC CASTS # UR COMP ASSIST: ABNORMAL /HPF (ref 0–4)
SP GR SPEC: 1.02 — SIGNIFICANT CHANGE UP (ref 1.01–1.02)
TYPE + AB SCN PNL BLD: SIGNIFICANT CHANGE UP
UROBILINOGEN FLD QL: NEGATIVE MG/DL — SIGNIFICANT CHANGE UP
WBC # BLD: 11.6 K/UL — HIGH (ref 4.8–10.8)
WBC # FLD AUTO: 11.6 K/UL — HIGH (ref 4.8–10.8)
WBC UR QL: SIGNIFICANT CHANGE UP

## 2019-02-22 PROCEDURE — 59400 OBSTETRICAL CARE: CPT

## 2019-02-22 RX ORDER — AMPICILLIN TRIHYDRATE 250 MG
CAPSULE ORAL
Refills: 0 | Status: DISCONTINUED | OUTPATIENT
Start: 2019-02-22 | End: 2019-02-22

## 2019-02-22 RX ORDER — TETANUS TOXOID, REDUCED DIPHTHERIA TOXOID AND ACELLULAR PERTUSSIS VACCINE, ADSORBED 5; 2.5; 8; 8; 2.5 [IU]/.5ML; [IU]/.5ML; UG/.5ML; UG/.5ML; UG/.5ML
0.5 SUSPENSION INTRAMUSCULAR ONCE
Refills: 0 | Status: DISCONTINUED | OUTPATIENT
Start: 2019-02-23 | End: 2019-02-24

## 2019-02-22 RX ORDER — OXYCODONE AND ACETAMINOPHEN 5; 325 MG/1; MG/1
2 TABLET ORAL EVERY 6 HOURS
Refills: 0 | Status: DISCONTINUED | OUTPATIENT
Start: 2019-02-23 | End: 2019-02-24

## 2019-02-22 RX ORDER — OXYTOCIN 10 UNIT/ML
41.67 VIAL (ML) INJECTION
Qty: 20 | Refills: 0 | Status: DISCONTINUED | OUTPATIENT
Start: 2019-02-23 | End: 2019-02-24

## 2019-02-22 RX ORDER — AMPICILLIN TRIHYDRATE 250 MG
2 CAPSULE ORAL ONCE
Refills: 0 | Status: COMPLETED | OUTPATIENT
Start: 2019-02-22 | End: 2019-02-22

## 2019-02-22 RX ORDER — OXYCODONE AND ACETAMINOPHEN 5; 325 MG/1; MG/1
1 TABLET ORAL
Refills: 0 | Status: DISCONTINUED | OUTPATIENT
Start: 2019-02-23 | End: 2019-02-24

## 2019-02-22 RX ORDER — AER TRAVELER 0.5 G/1
1 SOLUTION RECTAL; TOPICAL EVERY 4 HOURS
Refills: 0 | Status: DISCONTINUED | OUTPATIENT
Start: 2019-02-23 | End: 2019-02-24

## 2019-02-22 RX ORDER — DIPHENHYDRAMINE HCL 50 MG
25 CAPSULE ORAL EVERY 6 HOURS
Refills: 0 | Status: DISCONTINUED | OUTPATIENT
Start: 2019-02-23 | End: 2019-02-24

## 2019-02-22 RX ORDER — SODIUM CHLORIDE 9 MG/ML
1000 INJECTION, SOLUTION INTRAVENOUS ONCE
Refills: 0 | Status: DISCONTINUED | OUTPATIENT
Start: 2019-02-22 | End: 2019-02-22

## 2019-02-22 RX ORDER — AMPICILLIN TRIHYDRATE 250 MG
1 CAPSULE ORAL EVERY 4 HOURS
Refills: 0 | Status: DISCONTINUED | OUTPATIENT
Start: 2019-02-22 | End: 2019-02-24

## 2019-02-22 RX ORDER — DOCUSATE SODIUM 100 MG
100 CAPSULE ORAL
Refills: 0 | Status: DISCONTINUED | OUTPATIENT
Start: 2019-02-23 | End: 2019-02-24

## 2019-02-22 RX ORDER — CITRIC ACID/SODIUM CITRATE 300-500 MG
30 SOLUTION, ORAL ORAL ONCE
Refills: 0 | Status: DISCONTINUED | OUTPATIENT
Start: 2019-02-22 | End: 2019-02-22

## 2019-02-22 RX ORDER — SODIUM CHLORIDE 9 MG/ML
1000 INJECTION, SOLUTION INTRAVENOUS
Refills: 0 | Status: DISCONTINUED | OUTPATIENT
Start: 2019-02-22 | End: 2019-02-22

## 2019-02-22 RX ORDER — MAGNESIUM HYDROXIDE 400 MG/1
30 TABLET, CHEWABLE ORAL
Refills: 0 | Status: DISCONTINUED | OUTPATIENT
Start: 2019-02-23 | End: 2019-02-24

## 2019-02-22 RX ORDER — OXYTOCIN 10 UNIT/ML
333.33 VIAL (ML) INJECTION
Qty: 20 | Refills: 0 | Status: DISCONTINUED | OUTPATIENT
Start: 2019-02-22 | End: 2019-02-22

## 2019-02-22 RX ORDER — GLYCERIN ADULT
1 SUPPOSITORY, RECTAL RECTAL AT BEDTIME
Refills: 0 | Status: DISCONTINUED | OUTPATIENT
Start: 2019-02-23 | End: 2019-02-24

## 2019-02-22 RX ORDER — DIBUCAINE 1 %
1 OINTMENT (GRAM) RECTAL EVERY 4 HOURS
Refills: 0 | Status: DISCONTINUED | OUTPATIENT
Start: 2019-02-23 | End: 2019-02-24

## 2019-02-22 RX ORDER — AMPICILLIN TRIHYDRATE 250 MG
CAPSULE ORAL
Refills: 0 | Status: DISCONTINUED | OUTPATIENT
Start: 2019-02-22 | End: 2019-02-24

## 2019-02-22 RX ORDER — IBUPROFEN 200 MG
600 TABLET ORAL EVERY 6 HOURS
Refills: 0 | Status: DISCONTINUED | OUTPATIENT
Start: 2019-02-23 | End: 2019-02-24

## 2019-02-22 RX ORDER — OXYTOCIN 10 UNIT/ML
2 VIAL (ML) INJECTION
Qty: 30 | Refills: 0 | Status: DISCONTINUED | OUTPATIENT
Start: 2019-02-22 | End: 2019-02-24

## 2019-02-22 RX ORDER — AMPICILLIN TRIHYDRATE 250 MG
2 CAPSULE ORAL ONCE
Refills: 0 | Status: DISCONTINUED | OUTPATIENT
Start: 2019-02-22 | End: 2019-02-22

## 2019-02-22 RX ORDER — PRAMOXINE HYDROCHLORIDE 150 MG/15G
1 AEROSOL, FOAM RECTAL EVERY 4 HOURS
Refills: 0 | Status: DISCONTINUED | OUTPATIENT
Start: 2019-02-23 | End: 2019-02-24

## 2019-02-22 RX ORDER — HYDROCORTISONE 1 %
1 OINTMENT (GRAM) TOPICAL EVERY 4 HOURS
Refills: 0 | Status: DISCONTINUED | OUTPATIENT
Start: 2019-02-23 | End: 2019-02-24

## 2019-02-22 RX ORDER — ACETAMINOPHEN 500 MG
650 TABLET ORAL EVERY 6 HOURS
Refills: 0 | Status: DISCONTINUED | OUTPATIENT
Start: 2019-02-23 | End: 2019-02-24

## 2019-02-22 RX ORDER — SODIUM CHLORIDE 9 MG/ML
3 INJECTION INTRAMUSCULAR; INTRAVENOUS; SUBCUTANEOUS EVERY 8 HOURS
Refills: 0 | Status: DISCONTINUED | OUTPATIENT
Start: 2019-02-23 | End: 2019-02-24

## 2019-02-22 RX ORDER — AMPICILLIN TRIHYDRATE 250 MG
1 CAPSULE ORAL EVERY 4 HOURS
Refills: 0 | Status: DISCONTINUED | OUTPATIENT
Start: 2019-02-22 | End: 2019-02-22

## 2019-02-22 RX ORDER — LANOLIN
1 OINTMENT (GRAM) TOPICAL EVERY 6 HOURS
Refills: 0 | Status: DISCONTINUED | OUTPATIENT
Start: 2019-02-23 | End: 2019-02-24

## 2019-02-22 RX ORDER — SIMETHICONE 80 MG/1
80 TABLET, CHEWABLE ORAL EVERY 6 HOURS
Refills: 0 | Status: DISCONTINUED | OUTPATIENT
Start: 2019-02-23 | End: 2019-02-24

## 2019-02-22 RX ADMIN — SODIUM CHLORIDE 125 MILLILITER(S): 9 INJECTION, SOLUTION INTRAVENOUS at 10:44

## 2019-02-22 RX ADMIN — Medication 216 GRAM(S): at 08:54

## 2019-02-22 RX ADMIN — Medication 108 GRAM(S): at 12:53

## 2019-02-22 RX ADMIN — Medication 2 MILLIUNIT(S)/MIN: at 08:30

## 2019-02-22 NOTE — CHART NOTE - NSCHARTNOTEFT_GEN_A_CORE
Provider called to bedside due to prolonged fetal deceleration, about 11 minutes. Resuscitative measures with oxygen mask, left lateral positioning, and fetal scalp stimulation, with recovery to baseline with good variability.    FHT: Previously Category I, however now with 11 minute prolonged fetal deceleration down to 40s, with recovery to baseline 150s after resuscitative measures.  [place]: Ctx q 3-4 minutes, No Pitocin    SVE: 80/-1    A/P: 27 y/o  at 39 6/7 wks GA, admitted for induction of labor due to GDMA1, currently in labor.    -Discussed FHT issues with the patient. Due to FHT recovery after prolonged deceleration, patient does not need a C/S at this time, however she understands that she will need a C/S if this recurs.  -FSE replaced at bedside since the previous FSE came out. IUPC in place. CEFM.  -Continue Resuscitative measures. Continue holding Pitocin.  -Anesthesia consulted for epidural placement.  -Continue Ampicillin for GBS positive.  -Continue fingerstick glucose checks for GDMA1.  -Anticipate .

## 2019-02-22 NOTE — CHART NOTE - NSCHARTNOTEFT_GEN_A_CORE
Pt feeling rectal pressure.  FHT: Cat 1  [place]: q2-3m  SVE: 10/100/+1    Will set up for delivery.  Anticipate     D/W Dr. Lee

## 2019-02-22 NOTE — CHART NOTE - NSCHARTNOTEFT_GEN_A_CORE
Pt seen and examined at bedside. Relieved with epidural.   FHT (FSE): Cat 1  [place] (IUP): q2-3m    T(C): 36.7 (02-22-19 @ 12:04), Max: 37.1 (02-22-19 @ 07:44)  T(F): 98.06 (02-22-19 @ 12:04), Max: 98.8 (02-22-19 @ 07:44)  HR: 97 (02-22-19 @ 14:02) (73 - 106)  BP: 125/72 (02-22-19 @ 13:55) (106/57 - 148/65)  RR: 20 (02-22-19 @ 12:04) (18 - 22)  SpO2: 100% (02-22-19 @ 14:02) (89% - 100%)    Continue expectant management.  Continue Ampicillin for GBS+.

## 2019-02-22 NOTE — OB PROVIDER H&P - ASSESSMENT
27 y/o  at 39 6/7wks GA, presents for induction of labor due to GDMA1.    -Plan for induction with Pitocin.  -GBS positive- Ampicillin ordered.  -GDMA1- Fingerstick glucose every 4 hours in latent phase of labor, and every 2 hours in active phase of labor.  -CEFM + [place]  -Patient desires epidural when the pain becomes more intense.  -Anticipate .    Plan of care discussed with patient and her . Consent form signed. All of their questions and concerns were discussed.

## 2019-02-22 NOTE — CHART NOTE - NSCHARTNOTEFT_GEN_A_CORE
Patient is very uncomfortable and having moderate pains with contractions. She is requesting an epidural.    FHT: Category I FHT (not continuous monitoring due to continuous fetal movement)  [place]: Ctx q 3-4 minutes, Pitocin 2 mU    SVE: 5.5/80/-2, SROM at 08:30    A/P: 27 y/o  at 39 6/7 wks GA, admitted for induction of labor due to GDMA1, currently in labor.    -IUPC and FSE placed. IUPC placed to monitor contraction adequacy on Pitocin. FSE placed due to not being able to continuous FHR due to continuous fetal movement. CEFM.  -Anesthesia consulted for epidural placement.  -Continue Ampicillin for GBS positive.  -Continue fingerstick glucose checks for GDMA1.  -Anticipate

## 2019-02-22 NOTE — OB PROVIDER DELIVERY SUMMARY - NSPROVIDERDELIVERYNOTE_OBGYN_ALL_OB_FT
At 1538, this G1 now P1 At 1538, this G1 now P1 delivered a viable female infant vaginally over an intact perineum under epidural anesthesia. APGARs 9/9.  With good maternal pushing effort, baby presented in KARINA. Shoulders delivered atraumatically under gentle guidance through the birth canal. The baby was placed on the mother's abdomen and approximately 30s passed before the cord was clamped and cut. Cord segment was obtained. Placenta delivered spontaneously intact with a normal 3-vessel cord. Uterine fundus firm with bimanual massage and IV Pitocin. Superficial and hemostatic R periurethral laceration noted - repair not indicated. No perineal, sulcal, or cervical lacerations. Both mother and baby are doing well.  Weight deferred due to skin to skin.   Dr. Lee was present and supervised the delivery. At 1538, this G1 now P1 delivered a viable female infant vaginally over an intact perineum under epidural anesthesia. APGARs 9/9.  With good maternal pushing effort, baby presented in KARINA. Shoulders delivered atraumatically under gentle guidance through the birth canal. The baby was placed on the mother's abdomen and approximately 30s passed before the cord was clamped and cut. Cord segment was obtained. Placenta delivered spontaneously intact with a normal 3-vessel cord. Uterine fundus firm with bimanual massage and IV Pitocin. Superficial and hemostatic L periurethral laceration noted - repair not indicated. No perineal, sulcal, or cervical lacerations. Both mother and baby are doing well.  Weight deferred due to skin to skin.   Dr. Lee was present and supervised the delivery. Viable female infant delivered vaginally over an intact perineum under epidural anesthesia. APGARs 9/9.  With good maternal pushing effort, baby presented in KARINA position. Infant's head and shoulders were delivered atraumatically under gentle guidance through the birth canal. No nuchal cord was noted. The baby was placed on the mother's abdomen and delayed cord clamping for 30 seconds was done. Cord gases were obtained. Placenta was delivered spontaneously intact with a normal 3-vessel cord. Uterine fundus was firm with bimanual massage and IV Pitocin. Superficial left periurethral laceration was noted and was unrepaired due to excellent hemostasis. No perineal, vaginal or cervical lacerations were visualized. Weight deferred due to skin to skin.

## 2019-02-22 NOTE — OB PROVIDER H&P - HISTORY OF PRESENT ILLNESS
29 y/o  at 39 6/7 wks GA, presents for induction of labor due to GDMA1. Good glycemic control with diet. She reports having mild, irregular contractions. She reports "losing [her] mucous plug last night." Denies any vaginal bleeding or leakage of fluid. +Fetal movement.    Prenatal course significant for GDMA1. She previously had MFM consultation due to travel to [place] and Zika testing was negative.    PMH: Resolved thyroid disorder during high school- treated with diet and acupuncture.  OB Hx: TOP x 2.  Gyn Hx: Unremarkable  Surg Hx: Denies  Meds: PNV  All: NKDA. All: Shrimp.

## 2019-02-23 ENCOUNTER — TRANSCRIPTION ENCOUNTER (OUTPATIENT)
Age: 29
End: 2019-02-23

## 2019-02-23 LAB
BASOPHILS # BLD AUTO: 0 K/UL — SIGNIFICANT CHANGE UP (ref 0–0.2)
BASOPHILS NFR BLD AUTO: 0.1 % — SIGNIFICANT CHANGE UP (ref 0–2)
EOSINOPHIL # BLD AUTO: 0.1 K/UL — SIGNIFICANT CHANGE UP (ref 0–0.5)
EOSINOPHIL NFR BLD AUTO: 0.4 % — SIGNIFICANT CHANGE UP (ref 0–6)
HCT VFR BLD CALC: 36.7 % — LOW (ref 37–47)
HGB BLD-MCNC: 12.4 G/DL — SIGNIFICANT CHANGE UP (ref 12–16)
LYMPHOCYTES # BLD AUTO: 1.5 K/UL — SIGNIFICANT CHANGE UP (ref 1–4.8)
LYMPHOCYTES # BLD AUTO: 10.4 % — LOW (ref 20–55)
MCHC RBC-ENTMCNC: 30.9 PG — SIGNIFICANT CHANGE UP (ref 27–31)
MCHC RBC-ENTMCNC: 33.8 G/DL — SIGNIFICANT CHANGE UP (ref 32–36)
MCV RBC AUTO: 91.5 FL — SIGNIFICANT CHANGE UP (ref 81–99)
MONOCYTES # BLD AUTO: 1 K/UL — HIGH (ref 0–0.8)
MONOCYTES NFR BLD AUTO: 7 % — SIGNIFICANT CHANGE UP (ref 3–10)
NEUTROPHILS # BLD AUTO: 11.7 K/UL — HIGH (ref 1.8–8)
NEUTROPHILS NFR BLD AUTO: 81.5 % — HIGH (ref 37–73)
PLATELET # BLD AUTO: 201 K/UL — SIGNIFICANT CHANGE UP (ref 150–400)
RBC # BLD: 4.01 M/UL — LOW (ref 4.4–5.2)
RBC # FLD: 13.9 % — SIGNIFICANT CHANGE UP (ref 11–15.6)
T PALLIDUM AB TITR SER: NEGATIVE — SIGNIFICANT CHANGE UP
WBC # BLD: 14.4 K/UL — HIGH (ref 4.8–10.8)
WBC # FLD AUTO: 14.4 K/UL — HIGH (ref 4.8–10.8)

## 2019-02-23 RX ORDER — IBUPROFEN 200 MG
1 TABLET ORAL
Qty: 28 | Refills: 0
Start: 2019-02-23 | End: 2019-03-01

## 2019-02-23 RX ORDER — DOCUSATE SODIUM 100 MG
1 CAPSULE ORAL
Qty: 14 | Refills: 0
Start: 2019-02-23 | End: 2019-03-01

## 2019-02-23 RX ADMIN — Medication 1 TABLET(S): at 11:31

## 2019-02-23 NOTE — DISCHARGE NOTE OB - MATERIALS PROVIDED
Vaccinations/[facility] [place] Screening Program/[place]  Immunization Record/Breastfeeding Log/Breastfeeding Mother’s Support Group Information/Guide to Postpartum Care/[facility] Hearing Screen Program/Back To Sleep Handout/Shaken Baby Prevention Handout/Breastfeeding Guide and Packet/Birth Certificate Instructions/Discharge Medication Information for Patients and Families Pocket Guide/MMR Vaccination (VIS Pub Date: 2012)/Tdap Vaccination (VIS Pub Date: 2012)

## 2019-02-23 NOTE — PROGRESS NOTE ADULT - SUBJECTIVE AND OBJECTIVE BOX
INTERVAL HPI/OVERNIGHT EVENTS:  28y Female s/p labor epidural on 2/22/19    Vital Signs Last 24 Hrs  T(C): 36.9 (23 Feb 2019 18:53), Max: 36.9 (23 Feb 2019 07:57)  T(F): 98.5 (23 Feb 2019 18:53), Max: 98.5 (23 Feb 2019 07:57)  HR: 79 (23 Feb 2019 18:53) (79 - 86)  BP: 137/82 (23 Feb 2019 18:53) (115/75 - 137/82)  BP(mean): --  RR: 18 (23 Feb 2019 18:53) (18 - 18)  SpO2: 98% (23 Feb 2019 18:53) (98% - 98%)    Patient seen, doing well, no headache, no residual numbness or weakness, no anesthetic complications or complaints noted or reported.

## 2019-02-23 NOTE — PROGRESS NOTE ADULT - ASSESSMENT
A/P  Patient is s/p  day# 1  Patient is feeling well and reports no issues.     Continue the current pain medication.   Encourage ambulation and a regular diet.   Discharge according to the normal criteria.

## 2019-02-23 NOTE — PROGRESS NOTE ADULT - SUBJECTIVE AND OBJECTIVE BOX
S: Patient is doing well with no complaints. +Tolerating diet without any nausea or vomiting. Mild lochia. No bowel movement. +Voiding without difficulty. +Ambulating without any problems.    O:  Vital Signs Last 24 Hrs  T(C): 36.9 (23 Feb 2019 18:53), Max: 36.9 (23 Feb 2019 07:57)  T(F): 98.5 (23 Feb 2019 18:53), Max: 98.5 (23 Feb 2019 07:57)  HR: 79 (23 Feb 2019 18:53) (79 - 86)  BP: 137/82 (23 Feb 2019 18:53) (115/75 - 137/82)  BP(mean): --  RR: 18 (23 Feb 2019 18:53) (18 - 18)  SpO2: 98% (23 Feb 2019 18:53) (98% - 98%)  Gen: NAD. A&Ox3.  CV: RRR  Lungs: CTAB. No respiratory distress.  Abd: Soft, non-tender, non-distended, uterine fundus firm and below umbilicus.  Ext: No calf tenderness.    Labs: CBC:                        12.4   14.4  )-----------( 201      ( 23 Feb 2019 07:42 )             36.7

## 2019-02-23 NOTE — PROGRESS NOTE ADULT - SUBJECTIVE AND OBJECTIVE BOX
Postpartum Note Vaginal Delivery  Patient is a 27yo  s/p  day 1.    Subjective:  No acute events overnight.   Patient is tolerating diet and denies N/V.   Patient still has slight vaginal bleeding which is decreasing in amount.   She is breastfeeding and the baby is latching on.    Urinating appropriately.   -BM/+flatus.    Physical exam:  Vital Signs Last 24 Hrs  T(C): 37.1 (2019 20:39), Max: 37.4 (2019 16:39)  T(F): 98.8 (2019 20:39), Max: 99.3 (2019 16:39)  HR: 87 (2019 20:39) (73 - 130)  BP: 123/77 (2019 20:39) (106/57 - 159/84)  RR: 18 (2019 20:39) (18 - 22)  SpO2: 100% (2019 14:32) (89% - 100%)    Heart: RRR  Lungs: CTABL  Breast: non tender, not engorged   Abdomen: Soft, nontender, no distension, firm uterine fundus  Ext: No DVT signs, warm extremities    LABS:                        12.8   11.6  )-----------( 221      ( 2019 08:26 )             37.2

## 2019-02-23 NOTE — DISCHARGE NOTE OB - HOSPITAL COURSE
delivered via spontaneous vaginal delivery. She was transferred to postpartum unit without complications during her stay. Upon discharge she is voiding, tolerating PO, ambulating, and pain is controlled. Patient was admitted for induction of labor due to GDMA1 at term. She delivered a viable female  via spontaneous vaginal delivery. She was transferred to postpartum unit without complications during her stay. Upon discharge, she is voiding, tolerating oral diet, ambulating, and passing flatus. She will need 3 hr GTT at 6 weeks postpartum visit due to h/o GDMA. She will follow up in the office in 4-6 weeks for her postpartum visit.

## 2019-02-23 NOTE — DISCHARGE NOTE OB - PLAN OF CARE
delivered via spontaneous vaginal delivery. She was transferred to postpartum unit without complications during her stay. Upon discharge she is voiding, tolerating PO, ambulating, and pain is controlled. recovery 1. Nothing in the vagina for 6 weeks. No sex, no tampons, no douching.  2. Please call the office to schedule your postpartum appointment in 4-6 weeks.  3. Please call the office sooner if you have heavy vaginal bleeding, fevers, or severe abdominal pain.

## 2019-02-23 NOTE — DISCHARGE NOTE OB - MEDICATION SUMMARY - MEDICATIONS TO TAKE
I will START or STAY ON the medications listed below when I get home from the hospital:    ibuprofen 600 mg oral tablet  -- 1 tab(s) by mouth every 6 hours, As Needed  -for mild pain   -- Do not take this drug if you are pregnant.  It is very important that you take or use this exactly as directed.  Do not skip doses or discontinue unless directed by your doctor.  May cause drowsiness or dizziness.  Obtain medical advice before taking any non-prescription drugs as some may affect the action of this medication.  Take with food or milk.    -- Indication: For pain    Colace 100 mg oral capsule  -- 1 cap(s) by mouth 2 times a day, As Needed -for constipation   -- Medication should be taken with plenty of water.    -- Indication: For constipation

## 2019-02-23 NOTE — DISCHARGE NOTE OB - CARE PROVIDER_API CALL
Queenie Lee)  OBSGYN  [facility]  [address] [place] [address]  Phone: [phone]  Fax: [phone]  Follow Up Time:

## 2019-02-23 NOTE — DISCHARGE NOTE OB - PATIENT PORTAL LINK FT
You can access the [facility] Patient Portal, offered by [employer], by registering with the following website: [email or web address]

## 2019-02-23 NOTE — DISCHARGE NOTE OB - CARE PLAN
Principal Discharge DX:	 (spontaneous vaginal delivery)  Goal:	recovery  Assessment and plan of treatment:	delivered via spontaneous vaginal delivery. She was transferred to postpartum unit without complications during her stay. Upon discharge she is voiding, tolerating PO, ambulating, and pain is controlled. Principal Discharge DX:	 (spontaneous vaginal delivery)  Goal:	recovery  Assessment and plan of treatment:	1. Nothing in the vagina for 6 weeks. No sex, no tampons, no douching.  2. Please call the office to schedule your postpartum appointment in 4-6 weeks.  3. Please call the office sooner if you have heavy vaginal bleeding, fevers, or severe abdominal pain.

## 2019-02-23 NOTE — PROGRESS NOTE ADULT - ASSESSMENT
A/P: 29 y/o, PPD#1 s/p . Patient doing well postpartum.     [ ] Pain control PRN.  [ ] No DVT prophylaxis indicated at this time. Encourage ambulation.  [ ] Routine post partum care.  [ ] For discharge home in the AM if patient remains stable.

## 2019-02-23 NOTE — DISCHARGE NOTE OB - ADDITIONAL INSTRUCTIONS
Please call the office to schedule a followup appointment. Please call the office to schedule a followup appointment to be seen in 4-6 weeks.

## 2019-02-24 VITALS
TEMPERATURE: 98 F | HEART RATE: 84 BPM | SYSTOLIC BLOOD PRESSURE: 124 MMHG | DIASTOLIC BLOOD PRESSURE: 76 MMHG | RESPIRATION RATE: 18 BRPM

## 2019-02-24 PROCEDURE — 81001 URINALYSIS AUTO W/SCOPE: CPT

## 2019-02-24 PROCEDURE — 85027 COMPLETE CBC AUTOMATED: CPT

## 2019-02-24 PROCEDURE — 36415 COLL VENOUS BLD VENIPUNCTURE: CPT

## 2019-02-24 PROCEDURE — 86880 COOMBS TEST DIRECT: CPT

## 2019-02-24 PROCEDURE — 86780 TREPONEMA PALLIDUM: CPT

## 2019-02-24 PROCEDURE — 86850 RBC ANTIBODY SCREEN: CPT

## 2019-02-24 PROCEDURE — 86901 BLOOD TYPING SEROLOGIC RH(D): CPT

## 2019-02-24 PROCEDURE — 86900 BLOOD TYPING SEROLOGIC ABO: CPT

## 2019-02-24 PROCEDURE — 82962 GLUCOSE BLOOD TEST: CPT

## 2019-02-24 RX ADMIN — Medication 1 TABLET(S): at 12:31

## 2019-02-24 NOTE — PROGRESS NOTE ADULT - PROBLEM SELECTOR PLAN 1
Continue the current pain medication.   Encourage ambulation and a regular diet.   Discharge according to the normal criteria.

## 2019-02-24 NOTE — PROGRESS NOTE ADULT - SUBJECTIVE AND OBJECTIVE BOX
Postpartum Note Vaginal Delivery  Patient is a 27yo  s/p  day 2.    Subjective:  No acute events overnight.   Patient is tolerating diet and denies N/V.   Patient still has slight vaginal bleeding which is decreasing in amount.   She is breastfeeding and the baby is latching on.    Urinating appropriately.   -BM/+flatus.    Physical exam:  Vital Signs Last 24 Hrs  T(C): 36.9 (2019 18:53), Max: 36.9 (2019 07:57)  T(F): 98.5 (2019 18:53), Max: 98.5 (2019 07:57)  HR: 79 (2019 18:53) (79 - 86)  BP: 137/82 (2019 18:53) (115/75 - 137/82)  RR: 18 (2019 18:53) (18 - 18)  SpO2: 98% (2019 18:53) (98% - 98%)      Heart: RRR  Lungs: CTABL  Breast: non tender, not engorged   Abdomen: Soft, nontender, no distension, firm uterine fundus  Ext: No DVT signs, warm extremities    LABS:                                   12.4   14.4  )-----------( 201      ( 2019 07:42 )             36.7

## 2019-02-24 NOTE — PROGRESS NOTE ADULT - SUBJECTIVE AND OBJECTIVE BOX
S: Patient doing well with no complaints. +Tolerating diet without any nausea or vomiting. Mild lochia. +Flatus. No bowel movement. +Voiding without difficulty. +Ambulating without any problems.    O:  Vital Signs Last 24 Hrs  T(C): 36.8 (24 Feb 2019 08:30), Max: 36.9 (23 Feb 2019 18:53)  T(F): 98.3 (24 Feb 2019 08:30), Max: 98.5 (23 Feb 2019 18:53)  HR: 84 (24 Feb 2019 08:30) (79 - 84)  BP: 124/76 (24 Feb 2019 08:30) (124/76 - 137/82)  BP(mean): --  RR: 18 (24 Feb 2019 08:30) (18 - 18)  SpO2: 98% (23 Feb 2019 18:53) (98% - 98%)  Gen: NAD. A&Ox3.  CV: RRR  Lungs: CTAB. No respiratory distress.  Abd: Soft, non-tender, non-distended, uterine fundus firm and below umbilicus.  Ext: No calf tenderness.    Labs: CBC:                        12.4   14.4  )-----------( 201      ( 23 Feb 2019 07:42 )             36.7

## 2019-02-24 NOTE — PROGRESS NOTE ADULT - ASSESSMENT
A/P: 27 y/o, PPD#2 s/p . Patient doing well postpartum.     [ ] Pain control PRN.  [ ] No DVT prophylaxis indicated at this time. Encourage ambulation.  [ ] Routine post partum care.  [ ] For discharge home today.

## 2019-02-25 ENCOUNTER — APPOINTMENT (OUTPATIENT)
Dept: ANTEPARTUM | Facility: CLINIC | Age: 29
End: 2019-02-25

## 2019-02-28 ENCOUNTER — OTHER (OUTPATIENT)
Age: 29
End: 2019-02-28

## 2019-03-10 LAB
BILIRUB UR QL STRIP: NORMAL
GLUCOSE UR-MCNC: NORMAL
HCG UR QL: 2 EU/DL
HGB UR QL STRIP.AUTO: NORMAL
KETONES UR-MCNC: NORMAL
LEUKOCYTE ESTERASE UR QL STRIP: NORMAL
NITRITE UR QL STRIP: NORMAL
PH UR STRIP: 6.5
PROT UR STRIP-MCNC: NORMAL
SP GR UR STRIP: 1.01

## 2019-03-13 ENCOUNTER — APPOINTMENT (OUTPATIENT)
Dept: OBGYN | Facility: CLINIC | Age: 29
End: 2019-03-13
Payer: COMMERCIAL

## 2019-03-13 VITALS
TEMPERATURE: 100 F | BODY MASS INDEX: 22.68 KG/M2 | DIASTOLIC BLOOD PRESSURE: 70 MMHG | SYSTOLIC BLOOD PRESSURE: 114 MMHG | HEIGHT: 63 IN | WEIGHT: 128 LBS

## 2019-03-13 PROCEDURE — 99214 OFFICE O/P EST MOD 30 MIN: CPT

## 2019-03-13 RX ORDER — BLOOD SUGAR DIAGNOSTIC
STRIP MISCELLANEOUS
Qty: 150 | Refills: 3 | Status: DISCONTINUED | COMMUNITY
Start: 2018-12-19 | End: 2019-03-13

## 2019-03-13 RX ORDER — BLOOD-GLUCOSE METER
W/DEVICE KIT MISCELLANEOUS
Qty: 1 | Refills: 0 | Status: DISCONTINUED | COMMUNITY
Start: 2018-12-19 | End: 2019-03-13

## 2019-03-13 RX ORDER — DOCUSATE SODIUM 100 MG/1
100 CAPSULE, LIQUID FILLED ORAL
Qty: 14 | Refills: 0 | Status: DISCONTINUED | COMMUNITY
Start: 2019-02-23

## 2019-03-13 RX ORDER — LANCETS 28 GAUGE
EACH MISCELLANEOUS
Qty: 150 | Refills: 0 | Status: DISCONTINUED | COMMUNITY
Start: 2018-12-19 | End: 2019-03-13

## 2019-03-13 RX ORDER — IBUPROFEN 600 MG/1
600 TABLET ORAL
Qty: 28 | Refills: 0 | Status: DISCONTINUED | COMMUNITY
Start: 2019-02-23

## 2019-04-02 ENCOUNTER — APPOINTMENT (OUTPATIENT)
Dept: OBGYN | Facility: CLINIC | Age: 29
End: 2019-04-02
Payer: COMMERCIAL

## 2019-04-02 VITALS
DIASTOLIC BLOOD PRESSURE: 60 MMHG | WEIGHT: 129 LBS | SYSTOLIC BLOOD PRESSURE: 100 MMHG | HEIGHT: 63 IN | BODY MASS INDEX: 22.86 KG/M2

## 2019-04-02 LAB
BILIRUB UR QL STRIP: NORMAL
COLLECTION METHOD: NORMAL
GLUCOSE UR-MCNC: NORMAL
HCG UR QL: 0.2 EU/DL
HCG UR QL: NEGATIVE
HGB UR QL STRIP.AUTO: ABNORMAL
KETONES UR-MCNC: NORMAL
LEUKOCYTE ESTERASE UR QL STRIP: NORMAL
NITRITE UR QL STRIP: NORMAL
PH UR STRIP: 5.5
PROT UR STRIP-MCNC: NORMAL
QUALITY CONTROL: YES
SP GR UR STRIP: 1.02

## 2019-04-02 PROCEDURE — 81003 URINALYSIS AUTO W/O SCOPE: CPT | Mod: NC,QW

## 2019-04-02 PROCEDURE — 0503F POSTPARTUM CARE VISIT: CPT

## 2019-04-02 PROCEDURE — 81025 URINE PREGNANCY TEST: CPT | Mod: NC

## 2019-04-12 LAB
GLUCOSE 1H P 100 G GLC PO SERPL-MCNC: 137 MG/DL
GLUCOSE 2H P 100 G GLC PO SERPL-MCNC: 79 MG/DL
GLUCOSE BS SERPL-MCNC: 72 MG/DL

## 2019-05-13 ENCOUNTER — APPOINTMENT (OUTPATIENT)
Dept: OBGYN | Facility: CLINIC | Age: 29
End: 2019-05-13
Payer: COMMERCIAL

## 2019-05-13 ENCOUNTER — NON-APPOINTMENT (OUTPATIENT)
Age: 29
End: 2019-05-13

## 2019-05-13 VITALS
DIASTOLIC BLOOD PRESSURE: 60 MMHG | SYSTOLIC BLOOD PRESSURE: 108 MMHG | BODY MASS INDEX: 22.32 KG/M2 | WEIGHT: 126 LBS | HEIGHT: 63 IN

## 2019-05-13 DIAGNOSIS — Z01.419 ENCOUNTER FOR GYNECOLOGICAL EXAMINATION (GENERAL) (ROUTINE) W/OUT ABNORMAL FINDINGS: ICD-10-CM

## 2019-05-13 PROCEDURE — 81025 URINE PREGNANCY TEST: CPT

## 2019-05-13 PROCEDURE — 99395 PREV VISIT EST AGE 18-39: CPT

## 2019-05-13 PROCEDURE — 81003 URINALYSIS AUTO W/O SCOPE: CPT | Mod: QW

## 2019-10-03 ENCOUNTER — APPOINTMENT (OUTPATIENT)
Dept: OBGYN | Facility: CLINIC | Age: 29
End: 2019-10-03
Payer: COMMERCIAL

## 2019-10-03 ENCOUNTER — TRANSCRIPTION ENCOUNTER (OUTPATIENT)
Age: 29
End: 2019-10-03

## 2019-10-03 VITALS
BODY MASS INDEX: 20.82 KG/M2 | HEIGHT: 63 IN | WEIGHT: 117.5 LBS | SYSTOLIC BLOOD PRESSURE: 102 MMHG | DIASTOLIC BLOOD PRESSURE: 60 MMHG

## 2019-10-03 DIAGNOSIS — Z87.898 PERSONAL HISTORY OF OTHER SPECIFIED CONDITIONS: ICD-10-CM

## 2019-10-03 LAB
BILIRUB UR QL STRIP: NORMAL
GLUCOSE UR-MCNC: NORMAL
HCG UR QL: 0.2 EU/DL
HCG UR QL: POSITIVE
HGB UR QL STRIP.AUTO: ABNORMAL
KETONES UR-MCNC: NORMAL
LEUKOCYTE ESTERASE UR QL STRIP: NORMAL
NITRITE UR QL STRIP: NORMAL
PH UR STRIP: 7
PROT UR STRIP-MCNC: NORMAL
QUALITY CONTROL: YES
SP GR UR STRIP: 1

## 2019-10-03 PROCEDURE — 81003 URINALYSIS AUTO W/O SCOPE: CPT | Mod: QW

## 2019-10-03 PROCEDURE — 81025 URINE PREGNANCY TEST: CPT

## 2019-10-03 PROCEDURE — 99213 OFFICE O/P EST LOW 20 MIN: CPT

## 2019-10-03 PROCEDURE — 76817 TRANSVAGINAL US OBSTETRIC: CPT

## 2019-10-05 LAB
C TRACH RRNA SPEC QL NAA+PROBE: NOT DETECTED
N GONORRHOEA RRNA SPEC QL NAA+PROBE: NOT DETECTED
SOURCE AMPLIFICATION: NORMAL

## 2019-10-09 ENCOUNTER — APPOINTMENT (OUTPATIENT)
Dept: OBGYN | Facility: CLINIC | Age: 29
End: 2019-10-09
Payer: COMMERCIAL

## 2019-10-09 ENCOUNTER — ASOB RESULT (OUTPATIENT)
Age: 29
End: 2019-10-09

## 2019-10-09 ENCOUNTER — NON-APPOINTMENT (OUTPATIENT)
Age: 29
End: 2019-10-09

## 2019-10-09 VITALS
DIASTOLIC BLOOD PRESSURE: 72 MMHG | HEIGHT: 63 IN | BODY MASS INDEX: 21.09 KG/M2 | SYSTOLIC BLOOD PRESSURE: 124 MMHG | WEIGHT: 119 LBS

## 2019-10-09 LAB
BILIRUB UR QL STRIP: NORMAL
GLUCOSE UR-MCNC: NORMAL
HCG UR QL: 0.2 EU/DL
HGB UR QL STRIP.AUTO: ABNORMAL
KETONES UR-MCNC: NORMAL
LEUKOCYTE ESTERASE UR QL STRIP: NORMAL
NITRITE UR QL STRIP: NORMAL
PH UR STRIP: 6
PROT UR STRIP-MCNC: NORMAL
SP GR UR STRIP: 1

## 2019-10-09 PROCEDURE — 36415 COLL VENOUS BLD VENIPUNCTURE: CPT

## 2019-10-09 PROCEDURE — 76817 TRANSVAGINAL US OBSTETRIC: CPT

## 2019-10-09 PROCEDURE — 0500F INITIAL PRENATAL CARE VISIT: CPT

## 2019-10-11 LAB
ABO + RH PNL BLD: NORMAL
B19V IGG SER QL IA: 7 INDEX
B19V IGG+IGM SER-IMP: NORMAL
B19V IGG+IGM SER-IMP: POSITIVE
B19V IGM FLD-ACNC: 0.2
B19V IGM SER-ACNC: NEGATIVE
BASOPHILS # BLD AUTO: 0.05 K/UL
BASOPHILS NFR BLD AUTO: 0.5 %
BLD GP AB SCN SERPL QL: NORMAL
CMV IGG SERPL QL: <0.2 U/ML
CMV IGG SERPL-IMP: NEGATIVE
EOSINOPHIL # BLD AUTO: 0.18 K/UL
EOSINOPHIL NFR BLD AUTO: 1.7 %
ESTIMATED AVERAGE GLUCOSE: 91 MG/DL
HBA1C MFR BLD HPLC: 4.8 %
HBV SURFACE AG SER QL: NONREACTIVE
HCT VFR BLD CALC: 41.5 %
HGB BLD-MCNC: 13.4 G/DL
HIV1+2 AB SPEC QL IA.RAPID: NONREACTIVE
IMM GRANULOCYTES NFR BLD AUTO: 0.3 %
LYMPHOCYTES # BLD AUTO: 2.11 K/UL
LYMPHOCYTES NFR BLD AUTO: 19.8 %
MAN DIFF?: NORMAL
MCHC RBC-ENTMCNC: 30.7 PG
MCHC RBC-ENTMCNC: 32.3 GM/DL
MCV RBC AUTO: 95 FL
MEV IGG FLD QL IA: >300 AU/ML
MEV IGG+IGM SER-IMP: POSITIVE
MONOCYTES # BLD AUTO: 0.7 K/UL
MONOCYTES NFR BLD AUTO: 6.6 %
NEUTROPHILS # BLD AUTO: 7.57 K/UL
NEUTROPHILS NFR BLD AUTO: 71.1 %
PLATELET # BLD AUTO: 306 K/UL
RBC # BLD: 4.37 M/UL
RBC # FLD: 13.8 %
RUBV IGG FLD-ACNC: 4.3 INDEX
RUBV IGG SER-IMP: POSITIVE
T GONDII AB SER-IMP: NEGATIVE
T GONDII AB SER-IMP: NEGATIVE
T GONDII IGG SER QL: <3 IU/ML
T GONDII IGM SER QL: <3 AU/ML
T PALLIDUM AB SER QL IA: NEGATIVE
TSH SERPL-ACNC: 8.23 UIU/ML
WBC # FLD AUTO: 10.64 K/UL

## 2019-10-15 LAB
CMV IGM SERPL QL: 24.3 AU/ML
CMV IGM SERPL QL: NEGATIVE
HGB A MFR BLD: 97.1 %
HGB A2 MFR BLD: 2.9 %
HGB FRACT BLD-IMP: NORMAL

## 2019-10-19 ENCOUNTER — RX CHANGE (OUTPATIENT)
Age: 29
End: 2019-10-19

## 2019-10-19 ENCOUNTER — NON-APPOINTMENT (OUTPATIENT)
Age: 29
End: 2019-10-19

## 2019-10-19 LAB
T3FREE SERPL-MCNC: 3.04 PG/ML
T4 FREE SERPL-MCNC: 0.8 NG/DL
TSH SERPL-ACNC: 6.61 UIU/ML

## 2019-10-21 ENCOUNTER — TRANSCRIPTION ENCOUNTER (OUTPATIENT)
Age: 29
End: 2019-10-21

## 2019-10-28 ENCOUNTER — APPOINTMENT (OUTPATIENT)
Dept: ENDOCRINOLOGY | Facility: CLINIC | Age: 29
End: 2019-10-28
Payer: COMMERCIAL

## 2019-10-28 VITALS — SYSTOLIC BLOOD PRESSURE: 120 MMHG | HEIGHT: 63 IN | DIASTOLIC BLOOD PRESSURE: 74 MMHG | HEART RATE: 83 BPM

## 2019-10-28 PROCEDURE — 99203 OFFICE O/P NEW LOW 30 MIN: CPT

## 2019-11-05 ENCOUNTER — APPOINTMENT (OUTPATIENT)
Dept: OBGYN | Facility: CLINIC | Age: 29
End: 2019-11-05
Payer: COMMERCIAL

## 2019-11-05 VITALS
SYSTOLIC BLOOD PRESSURE: 112 MMHG | HEIGHT: 63 IN | WEIGHT: 125 LBS | BODY MASS INDEX: 22.15 KG/M2 | DIASTOLIC BLOOD PRESSURE: 68 MMHG

## 2019-11-05 PROCEDURE — 76815 OB US LIMITED FETUS(S): CPT

## 2019-11-09 ENCOUNTER — LABORATORY RESULT (OUTPATIENT)
Age: 29
End: 2019-11-09

## 2019-11-10 ENCOUNTER — FORM ENCOUNTER (OUTPATIENT)
Age: 29
End: 2019-11-10

## 2019-11-11 ENCOUNTER — APPOINTMENT (OUTPATIENT)
Dept: MATERNAL FETAL MEDICINE | Facility: CLINIC | Age: 29
End: 2019-11-11
Payer: COMMERCIAL

## 2019-11-11 ENCOUNTER — APPOINTMENT (OUTPATIENT)
Dept: ANTEPARTUM | Facility: CLINIC | Age: 29
End: 2019-11-11
Payer: COMMERCIAL

## 2019-11-11 ENCOUNTER — APPOINTMENT (OUTPATIENT)
Dept: ULTRASOUND IMAGING | Facility: CLINIC | Age: 29
End: 2019-11-11
Payer: COMMERCIAL

## 2019-11-11 ENCOUNTER — ASOB RESULT (OUTPATIENT)
Age: 29
End: 2019-11-11

## 2019-11-11 ENCOUNTER — OUTPATIENT (OUTPATIENT)
Dept: OUTPATIENT SERVICES | Facility: HOSPITAL | Age: 29
LOS: 1 days | End: 2019-11-11

## 2019-11-11 VITALS
SYSTOLIC BLOOD PRESSURE: 92 MMHG | BODY MASS INDEX: 22.07 KG/M2 | HEART RATE: 78 BPM | RESPIRATION RATE: 18 BRPM | HEIGHT: 63 IN | WEIGHT: 124.56 LBS | DIASTOLIC BLOOD PRESSURE: 60 MMHG | OXYGEN SATURATION: 98 %

## 2019-11-11 DIAGNOSIS — R79.89 OTHER SPECIFIED ABNORMAL FINDINGS OF BLOOD CHEMISTRY: ICD-10-CM

## 2019-11-11 DIAGNOSIS — R10.2 PELVIC AND PERINEAL PAIN: ICD-10-CM

## 2019-11-11 DIAGNOSIS — Z3A.39 39 WEEKS GESTATION OF PREGNANCY: ICD-10-CM

## 2019-11-11 DIAGNOSIS — N91.1 SECONDARY AMENORRHEA: ICD-10-CM

## 2019-11-11 DIAGNOSIS — O20.9 HEMORRHAGE IN EARLY PREGNANCY, UNSPECIFIED: ICD-10-CM

## 2019-11-11 DIAGNOSIS — Z20.821 OTHER SPECIFIED DISEASES AND CONDITIONS COMPLICATING PREGNANCY, CHILDBIRTH AND THE PUERPERIUM: ICD-10-CM

## 2019-11-11 DIAGNOSIS — O99.89 OTHER SPECIFIED DISEASES AND CONDITIONS COMPLICATING PREGNANCY, CHILDBIRTH AND THE PUERPERIUM: ICD-10-CM

## 2019-11-11 DIAGNOSIS — Z34.93 ENCOUNTER FOR SUPERVISION OF NORMAL PREGNANCY, UNSPECIFIED, THIRD TRIMESTER: ICD-10-CM

## 2019-11-11 DIAGNOSIS — Z3A.10 10 WEEKS GESTATION OF PREGNANCY: ICD-10-CM

## 2019-11-11 PROCEDURE — 36416 COLLJ CAPILLARY BLOOD SPEC: CPT

## 2019-11-11 PROCEDURE — 76813 OB US NUCHAL MEAS 1 GEST: CPT

## 2019-11-11 PROCEDURE — 99243 OFF/OP CNSLTJ NEW/EST LOW 30: CPT

## 2019-11-11 PROCEDURE — 76536 US EXAM OF HEAD AND NECK: CPT | Mod: 26

## 2019-11-13 LAB
1ST TRIMESTER DATA: NORMAL
ADDENDUM DOC: NORMAL
AFP PNL SERPL: NORMAL
AFP SERPL-ACNC: NORMAL
CLINICAL BIOCHEMIST REVIEW: NORMAL
FREE BETA HCG 1ST TRIMESTER: NORMAL
Lab: NORMAL
NOTES NTD: NORMAL
NT: NORMAL
PAPP-A SERPL-ACNC: NORMAL
TRISOMY 18/3: NORMAL

## 2019-11-22 ENCOUNTER — APPOINTMENT (OUTPATIENT)
Dept: ENDOCRINOLOGY | Facility: CLINIC | Age: 29
End: 2019-11-22
Payer: COMMERCIAL

## 2019-11-22 VITALS
DIASTOLIC BLOOD PRESSURE: 60 MMHG | HEIGHT: 63 IN | WEIGHT: 124 LBS | BODY MASS INDEX: 21.97 KG/M2 | HEART RATE: 75 BPM | SYSTOLIC BLOOD PRESSURE: 108 MMHG

## 2019-11-22 LAB — GLUCOSE BLDC GLUCOMTR-MCNC: 75

## 2019-11-22 PROCEDURE — 82962 GLUCOSE BLOOD TEST: CPT

## 2019-11-22 PROCEDURE — 99214 OFFICE O/P EST MOD 30 MIN: CPT | Mod: 25

## 2019-11-23 ENCOUNTER — ASOB RESULT (OUTPATIENT)
Age: 29
End: 2019-11-23

## 2019-11-23 ENCOUNTER — APPOINTMENT (OUTPATIENT)
Dept: MATERNAL FETAL MEDICINE | Facility: CLINIC | Age: 29
End: 2019-11-23
Payer: COMMERCIAL

## 2019-11-23 VITALS — WEIGHT: 121.25 LBS | HEIGHT: 63.5 IN | BODY MASS INDEX: 21.22 KG/M2

## 2019-11-23 PROCEDURE — G0108 DIAB MANAGE TRN  PER INDIV: CPT

## 2019-11-25 LAB
T3FREE SERPL-MCNC: 2.39 PG/ML
T4 FREE SERPL-MCNC: 1.2 NG/DL
TSH SERPL-ACNC: 1.86 UIU/ML

## 2019-11-25 RX ORDER — LEVOTHYROXINE SODIUM 50 UG/1
50 TABLET ORAL DAILY
Qty: 30 | Refills: 5 | Status: DISCONTINUED | COMMUNITY
Start: 2019-10-19 | End: 2019-11-25

## 2019-11-26 LAB
BILIRUB UR QL STRIP: NORMAL
GLUCOSE 1H P 50 G GLC PO SERPL-MCNC: 198 MG/DL
GLUCOSE UR-MCNC: NORMAL
HCG UR QL: 0.2 EU/DL
HGB UR QL STRIP.AUTO: NORMAL
KETONES UR-MCNC: NORMAL
LEUKOCYTE ESTERASE UR QL STRIP: NORMAL
NITRITE UR QL STRIP: NORMAL
PH UR STRIP: 6
PROT UR STRIP-MCNC: NORMAL
SP GR UR STRIP: <1.005

## 2019-12-03 ENCOUNTER — APPOINTMENT (OUTPATIENT)
Dept: OBGYN | Facility: CLINIC | Age: 29
End: 2019-12-03
Payer: COMMERCIAL

## 2019-12-03 VITALS — SYSTOLIC BLOOD PRESSURE: 118 MMHG | WEIGHT: 124 LBS | DIASTOLIC BLOOD PRESSURE: 64 MMHG | BODY MASS INDEX: 21.62 KG/M2

## 2019-12-03 DIAGNOSIS — Z3A.12 12 WEEKS GESTATION OF PREGNANCY: ICD-10-CM

## 2019-12-03 LAB
BILIRUB UR QL STRIP: NORMAL
GLUCOSE UR-MCNC: NORMAL
HCG UR QL: 0.2 EU/DL
HGB UR QL STRIP.AUTO: NORMAL
KETONES UR-MCNC: NORMAL
LEUKOCYTE ESTERASE UR QL STRIP: ABNORMAL
NITRITE UR QL STRIP: NORMAL
PH UR STRIP: 5.5
PROT UR STRIP-MCNC: NORMAL
SP GR UR STRIP: 1

## 2019-12-03 PROCEDURE — 0502F SUBSEQUENT PRENATAL CARE: CPT

## 2019-12-03 PROCEDURE — 36415 COLL VENOUS BLD VENIPUNCTURE: CPT

## 2019-12-05 ENCOUNTER — NON-APPOINTMENT (OUTPATIENT)
Age: 29
End: 2019-12-05

## 2019-12-05 LAB
BILIRUB UR QL STRIP: NORMAL
CLARITY UR: CLEAR
COLLECTION METHOD: NORMAL
CYTOLOGY CVX/VAG DOC THIN PREP: NORMAL
GLUCOSE UR-MCNC: NORMAL
HCG UR QL: 0.2 EU/DL
HCG UR QL: NEGATIVE
HGB UR QL STRIP.AUTO: NORMAL
HPV HIGH+LOW RISK DNA PNL CVX: NOT DETECTED
KETONES UR-MCNC: NORMAL
LEUKOCYTE ESTERASE UR QL STRIP: NORMAL
NITRITE UR QL STRIP: NORMAL
PH UR STRIP: 6
PROT UR STRIP-MCNC: NORMAL
QUALITY CONTROL: YES
SP GR UR STRIP: 1

## 2019-12-09 ENCOUNTER — RX RENEWAL (OUTPATIENT)
Age: 29
End: 2019-12-09

## 2019-12-09 ENCOUNTER — MEDICATION RENEWAL (OUTPATIENT)
Age: 29
End: 2019-12-09

## 2019-12-10 ENCOUNTER — MESSAGE (OUTPATIENT)
Age: 29
End: 2019-12-10

## 2019-12-13 ENCOUNTER — APPOINTMENT (OUTPATIENT)
Dept: OBGYN | Facility: CLINIC | Age: 29
End: 2019-12-13
Payer: COMMERCIAL

## 2019-12-13 PROCEDURE — 36415 COLL VENOUS BLD VENIPUNCTURE: CPT

## 2019-12-23 ENCOUNTER — ASOB RESULT (OUTPATIENT)
Age: 29
End: 2019-12-23

## 2019-12-23 ENCOUNTER — MEDICATION RENEWAL (OUTPATIENT)
Age: 29
End: 2019-12-23

## 2019-12-23 ENCOUNTER — APPOINTMENT (OUTPATIENT)
Dept: ANTEPARTUM | Facility: CLINIC | Age: 29
End: 2019-12-23
Payer: COMMERCIAL

## 2019-12-23 ENCOUNTER — APPOINTMENT (OUTPATIENT)
Dept: MATERNAL FETAL MEDICINE | Facility: CLINIC | Age: 29
End: 2019-12-23
Payer: COMMERCIAL

## 2019-12-23 VITALS
RESPIRATION RATE: 18 BRPM | HEART RATE: 78 BPM | DIASTOLIC BLOOD PRESSURE: 70 MMHG | SYSTOLIC BLOOD PRESSURE: 100 MMHG | HEIGHT: 63.5 IN | WEIGHT: 125.5 LBS | OXYGEN SATURATION: 98 % | BODY MASS INDEX: 21.96 KG/M2

## 2019-12-23 PROCEDURE — 76817 TRANSVAGINAL US OBSTETRIC: CPT | Mod: 59

## 2019-12-23 PROCEDURE — 76817 TRANSVAGINAL US OBSTETRIC: CPT

## 2019-12-23 PROCEDURE — 76811 OB US DETAILED SNGL FETUS: CPT

## 2019-12-23 PROCEDURE — 99214 OFFICE O/P EST MOD 30 MIN: CPT

## 2019-12-31 ENCOUNTER — APPOINTMENT (OUTPATIENT)
Dept: OBGYN | Facility: CLINIC | Age: 29
End: 2019-12-31
Payer: COMMERCIAL

## 2019-12-31 ENCOUNTER — NON-APPOINTMENT (OUTPATIENT)
Age: 29
End: 2019-12-31

## 2019-12-31 VITALS
HEIGHT: 63 IN | BODY MASS INDEX: 22.68 KG/M2 | DIASTOLIC BLOOD PRESSURE: 68 MMHG | WEIGHT: 128 LBS | SYSTOLIC BLOOD PRESSURE: 116 MMHG

## 2019-12-31 LAB
BILIRUB UR QL STRIP: NORMAL
GLUCOSE UR-MCNC: NORMAL
HCG UR QL: 0.2 EU/DL
HGB UR QL STRIP.AUTO: NORMAL
KETONES UR-MCNC: NORMAL
LEUKOCYTE ESTERASE UR QL STRIP: NORMAL
NITRITE UR QL STRIP: NORMAL
PH UR STRIP: 5.5
PROT UR STRIP-MCNC: NORMAL
SP GR UR STRIP: 1.03

## 2019-12-31 PROCEDURE — 0502F SUBSEQUENT PRENATAL CARE: CPT

## 2020-01-07 ENCOUNTER — NON-APPOINTMENT (OUTPATIENT)
Age: 30
End: 2020-01-07

## 2020-01-07 ENCOUNTER — APPOINTMENT (OUTPATIENT)
Dept: OBGYN | Facility: CLINIC | Age: 30
End: 2020-01-07
Payer: COMMERCIAL

## 2020-01-07 ENCOUNTER — ASOB RESULT (OUTPATIENT)
Age: 30
End: 2020-01-07

## 2020-01-07 VITALS
SYSTOLIC BLOOD PRESSURE: 108 MMHG | DIASTOLIC BLOOD PRESSURE: 58 MMHG | WEIGHT: 128 LBS | BODY MASS INDEX: 22.68 KG/M2 | HEIGHT: 63 IN

## 2020-01-07 LAB
APTT BLD: 29 SEC
BILIRUB UR QL STRIP: NORMAL
FIBRINOGEN PPP COAG.DERIVED-MCNC: 529 MG/DL
GLUCOSE UR-MCNC: NORMAL
HCG UR QL: 0.2 EU/DL
HGB UR QL STRIP.AUTO: NORMAL
INR PPP: 0.91 RATIO
KETONES UR-MCNC: NORMAL
LEUKOCYTE ESTERASE UR QL STRIP: ABNORMAL
NITRITE UR QL STRIP: NORMAL
PH UR STRIP: 6
PROT UR STRIP-MCNC: NORMAL
PT BLD: 10.3 SEC
SP GR UR STRIP: 1

## 2020-01-07 PROCEDURE — 0502F SUBSEQUENT PRENATAL CARE: CPT

## 2020-01-07 PROCEDURE — 59025 FETAL NON-STRESS TEST: CPT | Mod: 59

## 2020-01-07 PROCEDURE — 76816 OB US FOLLOW-UP PER FETUS: CPT

## 2020-01-18 ENCOUNTER — APPOINTMENT (OUTPATIENT)
Dept: MATERNAL FETAL MEDICINE | Facility: CLINIC | Age: 30
End: 2020-01-18
Payer: COMMERCIAL

## 2020-01-18 ENCOUNTER — ASOB RESULT (OUTPATIENT)
Age: 30
End: 2020-01-18

## 2020-01-18 VITALS — HEIGHT: 63 IN | WEIGHT: 129.06 LBS | BODY MASS INDEX: 22.87 KG/M2

## 2020-01-18 PROCEDURE — G0108 DIAB MANAGE TRN  PER INDIV: CPT

## 2020-01-20 LAB
BASOPHILS # BLD AUTO: 0.05 K/UL
BASOPHILS NFR BLD AUTO: 0.5 %
EOSINOPHIL # BLD AUTO: 0.15 K/UL
EOSINOPHIL NFR BLD AUTO: 1.5 %
HCT VFR BLD CALC: 35.9 %
HGB BLD-MCNC: 11.9 G/DL
IMM GRANULOCYTES NFR BLD AUTO: 0.8 %
LYMPHOCYTES # BLD AUTO: 1.78 K/UL
LYMPHOCYTES NFR BLD AUTO: 17.2 %
MAN DIFF?: NORMAL
MCHC RBC-ENTMCNC: 31.4 PG
MCHC RBC-ENTMCNC: 33.1 GM/DL
MCV RBC AUTO: 94.7 FL
MONOCYTES # BLD AUTO: 0.63 K/UL
MONOCYTES NFR BLD AUTO: 6.1 %
NEUTROPHILS # BLD AUTO: 7.63 K/UL
NEUTROPHILS NFR BLD AUTO: 73.9 %
PLATELET # BLD AUTO: 248 K/UL
RBC # BLD: 3.79 M/UL
RBC # FLD: 12.5 %
WBC # FLD AUTO: 10.32 K/UL

## 2020-01-27 ENCOUNTER — APPOINTMENT (OUTPATIENT)
Dept: OBGYN | Facility: CLINIC | Age: 30
End: 2020-01-27
Payer: COMMERCIAL

## 2020-01-27 ENCOUNTER — NON-APPOINTMENT (OUTPATIENT)
Age: 30
End: 2020-01-27

## 2020-01-27 VITALS
WEIGHT: 134 LBS | SYSTOLIC BLOOD PRESSURE: 114 MMHG | BODY MASS INDEX: 23.74 KG/M2 | HEIGHT: 63 IN | DIASTOLIC BLOOD PRESSURE: 58 MMHG

## 2020-01-27 LAB
BILIRUB UR QL STRIP: NORMAL
GLUCOSE UR-MCNC: NORMAL
HCG UR QL: 0.2 EU/DL
HGB UR QL STRIP.AUTO: NORMAL
KETONES UR-MCNC: NORMAL
LEUKOCYTE ESTERASE UR QL STRIP: ABNORMAL
NITRITE UR QL STRIP: NORMAL
PH UR STRIP: 7
PROT UR STRIP-MCNC: NORMAL
SP GR UR STRIP: 1.01

## 2020-01-27 PROCEDURE — 0502F SUBSEQUENT PRENATAL CARE: CPT

## 2020-02-04 ENCOUNTER — APPOINTMENT (OUTPATIENT)
Dept: ENDOCRINOLOGY | Facility: CLINIC | Age: 30
End: 2020-02-04

## 2020-02-04 ENCOUNTER — APPOINTMENT (OUTPATIENT)
Dept: ENDOCRINOLOGY | Facility: CLINIC | Age: 30
End: 2020-02-04
Payer: COMMERCIAL

## 2020-02-04 PROCEDURE — 36415 COLL VENOUS BLD VENIPUNCTURE: CPT

## 2020-02-05 LAB
ALBUMIN SERPL ELPH-MCNC: 4.4 G/DL
ALP BLD-CCNC: 62 U/L
ALT SERPL-CCNC: 10 U/L
ANION GAP SERPL CALC-SCNC: 14 MMOL/L
AST SERPL-CCNC: 16 U/L
BILIRUB SERPL-MCNC: <0.2 MG/DL
BUN SERPL-MCNC: 10 MG/DL
CALCIUM SERPL-MCNC: 9.3 MG/DL
CHLORIDE SERPL-SCNC: 101 MMOL/L
CO2 SERPL-SCNC: 22 MMOL/L
CREAT SERPL-MCNC: 0.68 MG/DL
GLUCOSE SERPL-MCNC: 78 MG/DL
POTASSIUM SERPL-SCNC: 4 MMOL/L
PROT SERPL-MCNC: 7 G/DL
SODIUM SERPL-SCNC: 136 MMOL/L
T4 FREE SERPL-MCNC: 1.1 NG/DL
T4 SERPL-MCNC: 10.1 UG/DL
TSH SERPL-ACNC: 1.43 UIU/ML

## 2020-02-08 ENCOUNTER — ASOB RESULT (OUTPATIENT)
Age: 30
End: 2020-02-08

## 2020-02-08 ENCOUNTER — APPOINTMENT (OUTPATIENT)
Dept: MATERNAL FETAL MEDICINE | Facility: CLINIC | Age: 30
End: 2020-02-08
Payer: COMMERCIAL

## 2020-02-08 ENCOUNTER — APPOINTMENT (OUTPATIENT)
Dept: ANTEPARTUM | Facility: CLINIC | Age: 30
End: 2020-02-08
Payer: COMMERCIAL

## 2020-02-08 VITALS — HEIGHT: 63 IN | BODY MASS INDEX: 23.79 KG/M2 | WEIGHT: 134.25 LBS

## 2020-02-08 PROCEDURE — G0108 DIAB MANAGE TRN  PER INDIV: CPT

## 2020-02-08 PROCEDURE — 76816 OB US FOLLOW-UP PER FETUS: CPT

## 2020-02-18 ENCOUNTER — APPOINTMENT (OUTPATIENT)
Dept: ENDOCRINOLOGY | Facility: CLINIC | Age: 30
End: 2020-02-18
Payer: COMMERCIAL

## 2020-02-18 VITALS
SYSTOLIC BLOOD PRESSURE: 110 MMHG | BODY MASS INDEX: 24.1 KG/M2 | DIASTOLIC BLOOD PRESSURE: 74 MMHG | HEART RATE: 83 BPM | WEIGHT: 136 LBS | HEIGHT: 63 IN

## 2020-02-18 LAB — GLUCOSE BLDC GLUCOMTR-MCNC: 156

## 2020-02-18 PROCEDURE — 82962 GLUCOSE BLOOD TEST: CPT

## 2020-02-18 PROCEDURE — 99213 OFFICE O/P EST LOW 20 MIN: CPT | Mod: 25

## 2020-02-24 ENCOUNTER — APPOINTMENT (OUTPATIENT)
Dept: OBGYN | Facility: CLINIC | Age: 30
End: 2020-02-24
Payer: COMMERCIAL

## 2020-02-24 VITALS — BODY MASS INDEX: 24.27 KG/M2 | SYSTOLIC BLOOD PRESSURE: 108 MMHG | WEIGHT: 137 LBS | DIASTOLIC BLOOD PRESSURE: 70 MMHG

## 2020-02-24 LAB
BILIRUB UR QL STRIP: NORMAL
GLUCOSE UR-MCNC: NORMAL
HCG UR QL: 0.2 EU/DL
HGB UR QL STRIP.AUTO: NORMAL
KETONES UR-MCNC: NORMAL
LEUKOCYTE ESTERASE UR QL STRIP: ABNORMAL
NITRITE UR QL STRIP: NORMAL
PH UR STRIP: 6
PROT UR STRIP-MCNC: NORMAL
SP GR UR STRIP: 1

## 2020-02-24 PROCEDURE — 36415 COLL VENOUS BLD VENIPUNCTURE: CPT

## 2020-02-24 PROCEDURE — 0502F SUBSEQUENT PRENATAL CARE: CPT

## 2020-02-25 ENCOUNTER — NON-APPOINTMENT (OUTPATIENT)
Age: 30
End: 2020-02-25

## 2020-02-27 LAB
BASOPHILS # BLD AUTO: 0.02 K/UL
BASOPHILS NFR BLD AUTO: 0.2 %
EOSINOPHIL # BLD AUTO: 0.12 K/UL
EOSINOPHIL NFR BLD AUTO: 1.2 %
HCT VFR BLD CALC: 35.9 %
HGB BLD-MCNC: 11.7 G/DL
IMM GRANULOCYTES NFR BLD AUTO: 0.8 %
LYMPHOCYTES # BLD AUTO: 1.8 K/UL
LYMPHOCYTES NFR BLD AUTO: 17.5 %
MAN DIFF?: NORMAL
MCHC RBC-ENTMCNC: 32 PG
MCHC RBC-ENTMCNC: 32.6 GM/DL
MCV RBC AUTO: 98.1 FL
MONOCYTES # BLD AUTO: 0.65 K/UL
MONOCYTES NFR BLD AUTO: 6.3 %
NEUTROPHILS # BLD AUTO: 7.61 K/UL
NEUTROPHILS NFR BLD AUTO: 74 %
PLATELET # BLD AUTO: 225 K/UL
RBC # BLD: 3.66 M/UL
RBC # FLD: 12.5 %
WBC # FLD AUTO: 10.28 K/UL

## 2020-03-03 ENCOUNTER — APPOINTMENT (OUTPATIENT)
Dept: ANTEPARTUM | Facility: CLINIC | Age: 30
End: 2020-03-03

## 2020-03-09 ENCOUNTER — OUTPATIENT (OUTPATIENT)
Dept: OUTPATIENT SERVICES | Facility: HOSPITAL | Age: 30
LOS: 1 days | End: 2020-03-09
Payer: COMMERCIAL

## 2020-03-09 ENCOUNTER — APPOINTMENT (OUTPATIENT)
Dept: ANTEPARTUM | Facility: CLINIC | Age: 30
End: 2020-03-09

## 2020-03-09 VITALS — HEART RATE: 114 BPM | DIASTOLIC BLOOD PRESSURE: 60 MMHG | SYSTOLIC BLOOD PRESSURE: 117 MMHG

## 2020-03-09 VITALS — HEART RATE: 120 BPM | SYSTOLIC BLOOD PRESSURE: 123 MMHG | DIASTOLIC BLOOD PRESSURE: 77 MMHG

## 2020-03-09 DIAGNOSIS — O47.03 FALSE LABOR BEFORE 37 COMPLETED WEEKS OF GESTATION, THIRD TRIMESTER: ICD-10-CM

## 2020-03-09 LAB
ANION GAP SERPL CALC-SCNC: 19 MMOL/L — HIGH (ref 5–17)
APPEARANCE UR: CLEAR — SIGNIFICANT CHANGE UP
BACTERIA # UR AUTO: NEGATIVE — SIGNIFICANT CHANGE UP
BILIRUB UR-MCNC: ABNORMAL
BUN SERPL-MCNC: 11 MG/DL — SIGNIFICANT CHANGE UP (ref 8–20)
CALCIUM SERPL-MCNC: 8.6 MG/DL — SIGNIFICANT CHANGE UP (ref 8.6–10.2)
CHLORIDE SERPL-SCNC: 97 MMOL/L — LOW (ref 98–107)
CO2 SERPL-SCNC: 19 MMOL/L — LOW (ref 22–29)
COLOR SPEC: YELLOW — SIGNIFICANT CHANGE UP
CREAT SERPL-MCNC: 0.41 MG/DL — LOW (ref 0.5–1.3)
DIFF PNL FLD: NEGATIVE — SIGNIFICANT CHANGE UP
EPI CELLS # UR: SIGNIFICANT CHANGE UP
GLUCOSE SERPL-MCNC: 145 MG/DL — HIGH (ref 70–99)
GLUCOSE UR QL: NEGATIVE MG/DL — SIGNIFICANT CHANGE UP
HCT VFR BLD CALC: 36.6 % — SIGNIFICANT CHANGE UP (ref 34.5–45)
HGB BLD-MCNC: 12.8 G/DL — SIGNIFICANT CHANGE UP (ref 11.5–15.5)
KETONES UR-MCNC: ABNORMAL
LEUKOCYTE ESTERASE UR-ACNC: ABNORMAL
MCHC RBC-ENTMCNC: 32.6 PG — SIGNIFICANT CHANGE UP (ref 27–34)
MCHC RBC-ENTMCNC: 35 GM/DL — SIGNIFICANT CHANGE UP (ref 32–36)
MCV RBC AUTO: 93.1 FL — SIGNIFICANT CHANGE UP (ref 80–100)
NITRITE UR-MCNC: NEGATIVE — SIGNIFICANT CHANGE UP
PH UR: 5 — SIGNIFICANT CHANGE UP (ref 5–8)
PLATELET # BLD AUTO: 175 K/UL — SIGNIFICANT CHANGE UP (ref 150–400)
POTASSIUM SERPL-MCNC: 3.3 MMOL/L — LOW (ref 3.5–5.3)
POTASSIUM SERPL-SCNC: 3.3 MMOL/L — LOW (ref 3.5–5.3)
PROT UR-MCNC: 30 MG/DL
RBC # BLD: 3.93 M/UL — SIGNIFICANT CHANGE UP (ref 3.8–5.2)
RBC # FLD: 11.9 % — SIGNIFICANT CHANGE UP (ref 10.3–14.5)
RBC CASTS # UR COMP ASSIST: NEGATIVE /HPF — SIGNIFICANT CHANGE UP (ref 0–4)
SODIUM SERPL-SCNC: 135 MMOL/L — SIGNIFICANT CHANGE UP (ref 135–145)
SP GR SPEC: 1.02 — SIGNIFICANT CHANGE UP (ref 1.01–1.02)
UROBILINOGEN FLD QL: NEGATIVE MG/DL — SIGNIFICANT CHANGE UP
WBC # BLD: 10.76 K/UL — HIGH (ref 3.8–10.5)
WBC # FLD AUTO: 10.76 K/UL — HIGH (ref 3.8–10.5)
WBC UR QL: SIGNIFICANT CHANGE UP

## 2020-03-09 PROCEDURE — G0463: CPT

## 2020-03-09 PROCEDURE — 59025 FETAL NON-STRESS TEST: CPT

## 2020-03-09 PROCEDURE — 81001 URINALYSIS AUTO W/SCOPE: CPT

## 2020-03-09 PROCEDURE — 36415 COLL VENOUS BLD VENIPUNCTURE: CPT

## 2020-03-09 PROCEDURE — 80048 BASIC METABOLIC PNL TOTAL CA: CPT

## 2020-03-09 PROCEDURE — 85027 COMPLETE CBC AUTOMATED: CPT

## 2020-03-09 RX ORDER — SODIUM CHLORIDE 9 MG/ML
1000 INJECTION, SOLUTION INTRAVENOUS
Refills: 0 | Status: COMPLETED | OUTPATIENT
Start: 2020-03-09 | End: 2020-03-09

## 2020-03-09 RX ADMIN — SODIUM CHLORIDE 999 MILLILITER(S): 9 INJECTION, SOLUTION INTRAVENOUS at 07:58

## 2020-03-09 NOTE — OB PROVIDER TRIAGE NOTE - PMH
Diet controlled gestational diabetes mellitus (GDM) in third trimester    Hypothyroidism affecting pregnancy in third trimester    No pertinent past medical history    Termination of pregnancy (fetus)  x2  Vaginal delivery  2019 Diet controlled gestational diabetes mellitus (GDM) in third trimester    Hypothyroidism affecting pregnancy in third trimester    Termination of pregnancy (fetus)  x2  Vaginal delivery  2019

## 2020-03-09 NOTE — OB RN TRIAGE NOTE - PMH
Diet controlled gestational diabetes mellitus (GDM) in third trimester    Hypothyroidism affecting pregnancy in third trimester    No pertinent past medical history    Termination of pregnancy (fetus)  x2  Vaginal delivery  2019

## 2020-03-09 NOTE — OB PROVIDER TRIAGE NOTE - NSOBPROVIDERNOTE_OBGYN_ALL_OB_FT
Patient is a 31yo  at 29w presenting today with N/V/D since last night.   - IV hydration with D5LR  - CBC, BMP, UA - wnl, ketones in urine  - Patient able to tolerate PO crackers and juice  - Category I FHT, no ctxs  - D/c home with return precautions if unable to tolerate PO, develops worsening N/V/D, develops fever    D/w Dr. Carlos

## 2020-03-09 NOTE — OB PROVIDER TRIAGE NOTE - NSHPPHYSICALEXAM_GEN_ALL_CORE
General: Alert and oriented x3, NAD  Abd: Soft, nontender, gravid    Vital Signs Last 24 Hrs  T(C): 37.3 (09 Mar 2020 07:29), Max: 37.3 (09 Mar 2020 07:26)  T(F): 99.1 (09 Mar 2020 07:29), Max: 99.14 (09 Mar 2020 07:26)  HR: 114 (09 Mar 2020 09:14) (114 - 120)  BP: 117/60 (09 Mar 2020 09:14) (117/60 - 123/77)  RR: 15 (09 Mar 2020 07:29) (15 - 15)    FHT: 140bpm - category I tracing  [place]: No ctxs noted

## 2020-03-09 NOTE — OB PROVIDER TRIAGE NOTE - HISTORY OF PRESENT ILLNESS
Patient is a 29yo  at 29w presenting today with N/V/D since last night. Diarrhea started at 9pm, watery, no blood, no pus. Nausea and vomiting then started at 11:30pm. Her last episode of diarrhea was at 6:45am. She has not vomited for several hours. Last time she ate was at 6:15pm. Denies fevers, URI sxs, dysuria, hematuria. 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 Patient is a 31yo  at 29w presenting today with N/V/D since last night. Diarrhea started at 9pm, watery, no blood, no pus. Nausea and vomiting then started at 11:30pm. Her last episode of diarrhea was at 6:45am. She has not vomited for several hours. Last time she ate was at 6:15pm. Denies fevers, URI sxs, dysuria, hematuria. 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     Prenatal course complicated by hypothyroidism and GDMA1    Obhx:  - NSVDx1  - TOPx2  Medhx: none  Surghx: D&C  Meds: Levothyroxine 50mcg qd, PNV  All: NKDA

## 2020-03-13 ENCOUNTER — NON-APPOINTMENT (OUTPATIENT)
Age: 30
End: 2020-03-13

## 2020-03-16 ENCOUNTER — OUTPATIENT (OUTPATIENT)
Dept: INPATIENT UNIT | Facility: HOSPITAL | Age: 30
LOS: 1 days | End: 2020-03-16
Payer: COMMERCIAL

## 2020-03-16 VITALS — TEMPERATURE: 99 F | RESPIRATION RATE: 16 BRPM

## 2020-03-16 VITALS
DIASTOLIC BLOOD PRESSURE: 63 MMHG | SYSTOLIC BLOOD PRESSURE: 108 MMHG | HEART RATE: 77 BPM | TEMPERATURE: 98 F | RESPIRATION RATE: 17 BRPM

## 2020-03-16 DIAGNOSIS — O47.03 FALSE LABOR BEFORE 37 COMPLETED WEEKS OF GESTATION, THIRD TRIMESTER: ICD-10-CM

## 2020-03-16 LAB — GLUCOSE BLDC GLUCOMTR-MCNC: 69 MG/DL — LOW (ref 70–99)

## 2020-03-16 PROCEDURE — 36415 COLL VENOUS BLD VENIPUNCTURE: CPT

## 2020-03-16 PROCEDURE — 87070 CULTURE OTHR SPECIMN AEROBIC: CPT

## 2020-03-16 PROCEDURE — 59025 FETAL NON-STRESS TEST: CPT

## 2020-03-16 PROCEDURE — 87591 N.GONORRHOEAE DNA AMP PROB: CPT

## 2020-03-16 PROCEDURE — 87800 DETECT AGNT MULT DNA DIREC: CPT

## 2020-03-16 PROCEDURE — G0463: CPT

## 2020-03-16 PROCEDURE — 82962 GLUCOSE BLOOD TEST: CPT

## 2020-03-16 PROCEDURE — 87491 CHLMYD TRACH DNA AMP PROBE: CPT

## 2020-03-16 PROCEDURE — 99213 OFFICE O/P EST LOW 20 MIN: CPT

## 2020-03-16 NOTE — OB PROVIDER TRIAGE NOTE - HISTORY OF PRESENT ILLNESS
31 y/o woman  GA 30wk with a history of gestational diabetes controlled with diet and hypothyroidism on levothyroxine during the current pregnancy presenting after a large rush of fluid came out of her vagina about 1hr prior to presentation. The event happened after urinating and the fluid was clear, no blood noted. She felt sharp pains and increased pressure around the time of the incident but is no longer in pain. These pains did not feel like contractions. The baby has been moving with normal levels of activity. She denies vaginal bleeding. 31 y/o woman  GA 30wk with a history of gestational diabetes controlled with diet and hypothyroidism on levothyroxine during the current pregnancy presenting after a large rush of fluid came out of her vagina about 1hr prior to presentation. The event happened after urinating and the fluid was clear, no blood noted. She felt sharp pains and increased pressure around the time of the incident but is no longer in pain. These pains did not feel like contractions. The baby has been moving with normal levels of activity. She denies vaginal bleeding.   obhx:   -  x1, eTOP x2   pmhx: hypothyroidism in pregnancy, GDMA1  pshx: none  meds: levothyroxine 50mcg, pnv  allergies: nkda

## 2020-03-16 NOTE — OB PROVIDER TRIAGE NOTE - NSOBPROVIDERNOTE_OBGYN_ALL_OB_FT
Patient is a 30y  at 30w who presented to L&D for rule out rupture of membranes. Physical exam is not concerning at this time for rupture of membranes.    - cultures collected, nitrazine negative and amnisure negative

## 2020-03-16 NOTE — OB PROVIDER TRIAGE NOTE - NSHPPHYSICALEXAM_GEN_ALL_CORE
Vital Signs Last 24 Hrs  T(C): 36.9 (16 Mar 2020 18:48), Max: 36.9 (16 Mar 2020 18:40)  T(F): 98.4 (16 Mar 2020 18:48), Max: 98.42 (16 Mar 2020 18:40)  HR: 77 (16 Mar 2020 18:48) (77 - 77)  BP: 108/63 (16 Mar 2020 18:48) (108/63 - 108/63)  RR: 17 (16 Mar 2020 18:48) (17 - 17)  General: Alert and oriented x3, no acute distress  Pelvic: 0.5/0/-3  FHT: baseline 130bpm, moderate variability, +accels, -deccels  [place]: none Vital Signs Last 24 Hrs  T(C): 36.9 (16 Mar 2020 18:48), Max: 36.9 (16 Mar 2020 18:40)  T(F): 98.4 (16 Mar 2020 18:48), Max: 98.42 (16 Mar 2020 18:40)  HR: 77 (16 Mar 2020 18:48) (77 - 77)  BP: 108/63 (16 Mar 2020 18:48) (108/63 - 108/63)  RR: 17 (16 Mar 2020 18:48) (17 - 17)  General: Alert and oriented x3, no acute distress  Pelvic: 0.5/0/-3  FHT: baseline 130bpm, moderate variability, +accels, -deccels  [place]: none  Bedside Ultrasound: TIARA 12.02, BPP 8/8 bedside vertex/ posterior placenta

## 2020-03-17 ENCOUNTER — ASOB RESULT (OUTPATIENT)
Age: 30
End: 2020-03-17

## 2020-03-17 ENCOUNTER — APPOINTMENT (OUTPATIENT)
Dept: ANTEPARTUM | Facility: CLINIC | Age: 30
End: 2020-03-17
Payer: COMMERCIAL

## 2020-03-17 LAB
C TRACH RRNA SPEC QL NAA+PROBE: SIGNIFICANT CHANGE UP
CANDIDA AB TITR SER: SIGNIFICANT CHANGE UP
G VAGINALIS DNA SPEC QL NAA+PROBE: SIGNIFICANT CHANGE UP
N GONORRHOEA RRNA SPEC QL NAA+PROBE: SIGNIFICANT CHANGE UP
SPECIMEN SOURCE: SIGNIFICANT CHANGE UP
T VAGINALIS SPEC QL WET PREP: SIGNIFICANT CHANGE UP

## 2020-03-17 PROCEDURE — 76816 OB US FOLLOW-UP PER FETUS: CPT

## 2020-03-17 PROCEDURE — 76820 UMBILICAL ARTERY ECHO: CPT

## 2020-03-17 PROCEDURE — 93976 VASCULAR STUDY: CPT

## 2020-03-18 LAB
CULTURE RESULTS: SIGNIFICANT CHANGE UP
SPECIMEN SOURCE: SIGNIFICANT CHANGE UP

## 2020-03-19 ENCOUNTER — APPOINTMENT (OUTPATIENT)
Dept: OBGYN | Facility: CLINIC | Age: 30
End: 2020-03-19
Payer: COMMERCIAL

## 2020-03-19 ENCOUNTER — NON-APPOINTMENT (OUTPATIENT)
Age: 30
End: 2020-03-19

## 2020-03-19 VITALS
BODY MASS INDEX: 24.27 KG/M2 | DIASTOLIC BLOOD PRESSURE: 70 MMHG | WEIGHT: 137 LBS | HEIGHT: 63 IN | SYSTOLIC BLOOD PRESSURE: 116 MMHG

## 2020-03-19 PROCEDURE — 90471 IMMUNIZATION ADMIN: CPT

## 2020-03-19 PROCEDURE — 0502F SUBSEQUENT PRENATAL CARE: CPT

## 2020-03-19 PROCEDURE — 90715 TDAP VACCINE 7 YRS/> IM: CPT

## 2020-03-23 ENCOUNTER — APPOINTMENT (OUTPATIENT)
Dept: MATERNAL FETAL MEDICINE | Facility: CLINIC | Age: 30
End: 2020-03-23
Payer: COMMERCIAL

## 2020-03-23 ENCOUNTER — ASOB RESULT (OUTPATIENT)
Age: 30
End: 2020-03-23

## 2020-03-23 VITALS — HEIGHT: 63 IN | BODY MASS INDEX: 24.45 KG/M2 | WEIGHT: 138 LBS

## 2020-03-23 PROCEDURE — G0108 DIAB MANAGE TRN  PER INDIV: CPT

## 2020-03-24 PROBLEM — Z01.419 WOMEN'S ANNUAL ROUTINE GYNECOLOGICAL EXAMINATION: Status: RESOLVED | Noted: 2019-05-13 | Resolved: 2020-03-24

## 2020-04-03 ENCOUNTER — APPOINTMENT (OUTPATIENT)
Dept: ENDOCRINOLOGY | Facility: CLINIC | Age: 30
End: 2020-04-03
Payer: COMMERCIAL

## 2020-04-03 ENCOUNTER — APPOINTMENT (OUTPATIENT)
Dept: ENDOCRINOLOGY | Facility: CLINIC | Age: 30
End: 2020-04-03

## 2020-04-03 ENCOUNTER — NON-APPOINTMENT (OUTPATIENT)
Age: 30
End: 2020-04-03

## 2020-04-03 ENCOUNTER — APPOINTMENT (OUTPATIENT)
Dept: OBGYN | Facility: CLINIC | Age: 30
End: 2020-04-03
Payer: COMMERCIAL

## 2020-04-03 VITALS
BODY MASS INDEX: 24.27 KG/M2 | HEIGHT: 63 IN | DIASTOLIC BLOOD PRESSURE: 62 MMHG | WEIGHT: 137 LBS | SYSTOLIC BLOOD PRESSURE: 118 MMHG

## 2020-04-03 PROCEDURE — 0502F SUBSEQUENT PRENATAL CARE: CPT

## 2020-04-03 PROCEDURE — G2012 BRIEF CHECK IN BY MD/QHP: CPT

## 2020-04-08 ENCOUNTER — NON-APPOINTMENT (OUTPATIENT)
Age: 30
End: 2020-04-08

## 2020-04-08 ENCOUNTER — TRANSCRIPTION ENCOUNTER (OUTPATIENT)
Age: 30
End: 2020-04-08

## 2020-04-15 ENCOUNTER — APPOINTMENT (OUTPATIENT)
Dept: ANTEPARTUM | Facility: CLINIC | Age: 30
End: 2020-04-15
Payer: COMMERCIAL

## 2020-04-15 ENCOUNTER — ASOB RESULT (OUTPATIENT)
Age: 30
End: 2020-04-15

## 2020-04-15 VITALS — TEMPERATURE: 98.1 F

## 2020-04-15 PROCEDURE — 76816 OB US FOLLOW-UP PER FETUS: CPT

## 2020-04-15 PROCEDURE — 76819 FETAL BIOPHYS PROFIL W/O NST: CPT

## 2020-04-15 PROCEDURE — 76820 UMBILICAL ARTERY ECHO: CPT

## 2020-04-20 ENCOUNTER — APPOINTMENT (OUTPATIENT)
Dept: MATERNAL FETAL MEDICINE | Facility: CLINIC | Age: 30
End: 2020-04-20
Payer: COMMERCIAL

## 2020-04-20 ENCOUNTER — ASOB RESULT (OUTPATIENT)
Age: 30
End: 2020-04-20

## 2020-04-20 PROCEDURE — G0108 DIAB MANAGE TRN  PER INDIV: CPT | Mod: 95

## 2020-04-29 ENCOUNTER — APPOINTMENT (OUTPATIENT)
Dept: OBGYN | Facility: CLINIC | Age: 30
End: 2020-04-29
Payer: COMMERCIAL

## 2020-04-29 ENCOUNTER — ASOB RESULT (OUTPATIENT)
Age: 30
End: 2020-04-29

## 2020-04-29 ENCOUNTER — NON-APPOINTMENT (OUTPATIENT)
Age: 30
End: 2020-04-29

## 2020-04-29 VITALS
SYSTOLIC BLOOD PRESSURE: 112 MMHG | BODY MASS INDEX: 25.69 KG/M2 | WEIGHT: 145 LBS | HEIGHT: 63 IN | DIASTOLIC BLOOD PRESSURE: 62 MMHG

## 2020-04-29 PROCEDURE — 76818 FETAL BIOPHYS PROFILE W/NST: CPT

## 2020-04-29 PROCEDURE — 0502F SUBSEQUENT PRENATAL CARE: CPT

## 2020-04-29 PROCEDURE — 36415 COLL VENOUS BLD VENIPUNCTURE: CPT

## 2020-04-30 LAB
BILIRUB UR QL STRIP: NORMAL
GLUCOSE UR-MCNC: NORMAL
HCG UR QL: 0.2 EU/DL
HGB UR QL STRIP.AUTO: NORMAL
HIV1+2 AB SPEC QL IA.RAPID: NONREACTIVE
KETONES UR-MCNC: NORMAL
LEUKOCYTE ESTERASE UR QL STRIP: ABNORMAL
MEV IGG FLD QL IA: >300 AU/ML
MEV IGG+IGM SER-IMP: POSITIVE
NITRITE UR QL STRIP: NORMAL
PH UR STRIP: 7
PROT UR STRIP-MCNC: NORMAL
SP GR UR STRIP: 1.01

## 2020-05-05 LAB — B-HEM STREP SPEC QL CULT: NORMAL

## 2020-05-06 ENCOUNTER — APPOINTMENT (OUTPATIENT)
Dept: OBGYN | Facility: CLINIC | Age: 30
End: 2020-05-06
Payer: COMMERCIAL

## 2020-05-06 ENCOUNTER — NON-APPOINTMENT (OUTPATIENT)
Age: 30
End: 2020-05-06

## 2020-05-06 ENCOUNTER — ASOB RESULT (OUTPATIENT)
Age: 30
End: 2020-05-06

## 2020-05-06 VITALS
BODY MASS INDEX: 26.22 KG/M2 | WEIGHT: 148 LBS | DIASTOLIC BLOOD PRESSURE: 78 MMHG | SYSTOLIC BLOOD PRESSURE: 112 MMHG | HEIGHT: 63 IN

## 2020-05-06 LAB
BILIRUB UR QL STRIP: NORMAL
GLUCOSE UR-MCNC: NORMAL
HCG UR QL: 0.2 EU/DL
HGB UR QL STRIP.AUTO: NORMAL
KETONES UR-MCNC: NORMAL
LEUKOCYTE ESTERASE UR QL STRIP: ABNORMAL
NITRITE UR QL STRIP: NORMAL
PH UR STRIP: 7
PROT UR STRIP-MCNC: NORMAL
SP GR UR STRIP: <1.005

## 2020-05-06 PROCEDURE — 76818 FETAL BIOPHYS PROFILE W/NST: CPT

## 2020-05-06 PROCEDURE — 0502F SUBSEQUENT PRENATAL CARE: CPT

## 2020-05-11 ENCOUNTER — APPOINTMENT (OUTPATIENT)
Dept: MATERNAL FETAL MEDICINE | Facility: CLINIC | Age: 30
End: 2020-05-11
Payer: COMMERCIAL

## 2020-05-11 ENCOUNTER — ASOB RESULT (OUTPATIENT)
Age: 30
End: 2020-05-11

## 2020-05-11 VITALS — HEIGHT: 63 IN | BODY MASS INDEX: 26.22 KG/M2 | WEIGHT: 148 LBS

## 2020-05-11 PROCEDURE — G0108 DIAB MANAGE TRN  PER INDIV: CPT | Mod: 95

## 2020-05-13 ENCOUNTER — APPOINTMENT (OUTPATIENT)
Dept: OBGYN | Facility: CLINIC | Age: 30
End: 2020-05-13
Payer: COMMERCIAL

## 2020-05-13 ENCOUNTER — APPOINTMENT (OUTPATIENT)
Dept: ANTEPARTUM | Facility: CLINIC | Age: 30
End: 2020-05-13

## 2020-05-13 ENCOUNTER — NON-APPOINTMENT (OUTPATIENT)
Age: 30
End: 2020-05-13

## 2020-05-13 ENCOUNTER — ASOB RESULT (OUTPATIENT)
Age: 30
End: 2020-05-13

## 2020-05-13 VITALS
BODY MASS INDEX: 25.87 KG/M2 | DIASTOLIC BLOOD PRESSURE: 70 MMHG | SYSTOLIC BLOOD PRESSURE: 112 MMHG | WEIGHT: 146 LBS | HEIGHT: 63 IN

## 2020-05-13 LAB
BILIRUB UR QL STRIP: NORMAL
GLUCOSE UR-MCNC: NORMAL
HCG UR QL: 0.2 EU/DL
HGB UR QL STRIP.AUTO: NORMAL
KETONES UR-MCNC: NORMAL
LEUKOCYTE ESTERASE UR QL STRIP: NORMAL
NITRITE UR QL STRIP: NORMAL
PH UR STRIP: 7
PROT UR STRIP-MCNC: NORMAL
SP GR UR STRIP: 1.02

## 2020-05-13 PROCEDURE — 76818 FETAL BIOPHYS PROFILE W/NST: CPT

## 2020-05-13 PROCEDURE — 76816 OB US FOLLOW-UP PER FETUS: CPT | Mod: 59

## 2020-05-13 PROCEDURE — 0502F SUBSEQUENT PRENATAL CARE: CPT

## 2020-05-20 ENCOUNTER — APPOINTMENT (OUTPATIENT)
Dept: OBGYN | Facility: CLINIC | Age: 30
End: 2020-05-20
Payer: COMMERCIAL

## 2020-05-20 ENCOUNTER — ASOB RESULT (OUTPATIENT)
Age: 30
End: 2020-05-20

## 2020-05-20 ENCOUNTER — NON-APPOINTMENT (OUTPATIENT)
Age: 30
End: 2020-05-20

## 2020-05-20 VITALS — WEIGHT: 146 LBS | BODY MASS INDEX: 25.87 KG/M2 | HEIGHT: 63 IN

## 2020-05-20 LAB
BILIRUB UR QL STRIP: NORMAL
GLUCOSE UR-MCNC: NORMAL
HCG UR QL: 0.2 EU/DL
HGB UR QL STRIP.AUTO: NORMAL
KETONES UR-MCNC: NORMAL
LEUKOCYTE ESTERASE UR QL STRIP: ABNORMAL
NITRITE UR QL STRIP: NORMAL
PH UR STRIP: 6.5
PROT UR STRIP-MCNC: NORMAL
SP GR UR STRIP: 1.01

## 2020-05-20 PROCEDURE — 0502F SUBSEQUENT PRENATAL CARE: CPT

## 2020-05-20 PROCEDURE — 76818 FETAL BIOPHYS PROFILE W/NST: CPT

## 2020-05-22 LAB — BACTERIA UR CULT: NORMAL

## 2020-05-27 ENCOUNTER — APPOINTMENT (OUTPATIENT)
Dept: OBGYN | Facility: CLINIC | Age: 30
End: 2020-05-27
Payer: COMMERCIAL

## 2020-05-27 ENCOUNTER — ASOB RESULT (OUTPATIENT)
Age: 30
End: 2020-05-27

## 2020-05-27 ENCOUNTER — NON-APPOINTMENT (OUTPATIENT)
Age: 30
End: 2020-05-27

## 2020-05-27 ENCOUNTER — INPATIENT (INPATIENT)
Facility: HOSPITAL | Age: 30
LOS: 1 days | Discharge: HOME | End: 2020-05-29
Attending: SPECIALIST | Admitting: SPECIALIST
Payer: COMMERCIAL

## 2020-05-27 VITALS
HEIGHT: 63 IN | TEMPERATURE: 99 F | SYSTOLIC BLOOD PRESSURE: 119 MMHG | DIASTOLIC BLOOD PRESSURE: 81 MMHG | HEART RATE: 96 BPM | RESPIRATION RATE: 16 BRPM | WEIGHT: 147.93 LBS

## 2020-05-27 VITALS
DIASTOLIC BLOOD PRESSURE: 70 MMHG | BODY MASS INDEX: 26.4 KG/M2 | SYSTOLIC BLOOD PRESSURE: 110 MMHG | WEIGHT: 149 LBS | HEIGHT: 63 IN

## 2020-05-27 DIAGNOSIS — O26.893 OTHER SPECIFIED PREGNANCY RELATED CONDITIONS, THIRD TRIMESTER: ICD-10-CM

## 2020-05-27 LAB
ALBUMIN SERPL ELPH-MCNC: 3.8 G/DL — SIGNIFICANT CHANGE UP (ref 3.3–5.2)
ALP SERPL-CCNC: 170 U/L — HIGH (ref 40–120)
ALT FLD-CCNC: 11 U/L — SIGNIFICANT CHANGE UP
ANION GAP SERPL CALC-SCNC: 18 MMOL/L — HIGH (ref 5–17)
AST SERPL-CCNC: 18 U/L — SIGNIFICANT CHANGE UP
BASOPHILS # BLD AUTO: 0.02 K/UL — SIGNIFICANT CHANGE UP (ref 0–0.2)
BASOPHILS NFR BLD AUTO: 0.2 % — SIGNIFICANT CHANGE UP (ref 0–2)
BILIRUB SERPL-MCNC: 0.2 MG/DL — LOW (ref 0.4–2)
BILIRUB UR QL STRIP: NORMAL
BLD GP AB SCN SERPL QL: SIGNIFICANT CHANGE UP
BUN SERPL-MCNC: 8 MG/DL — SIGNIFICANT CHANGE UP (ref 8–20)
CALCIUM SERPL-MCNC: 9.3 MG/DL — SIGNIFICANT CHANGE UP (ref 8.6–10.2)
CHLORIDE SERPL-SCNC: 98 MMOL/L — SIGNIFICANT CHANGE UP (ref 98–107)
CO2 SERPL-SCNC: 18 MMOL/L — LOW (ref 22–29)
COLLECTION METHOD: NORMAL
CREAT SERPL-MCNC: 0.55 MG/DL — SIGNIFICANT CHANGE UP (ref 0.5–1.3)
EOSINOPHIL # BLD AUTO: 0.04 K/UL — SIGNIFICANT CHANGE UP (ref 0–0.5)
EOSINOPHIL NFR BLD AUTO: 0.4 % — SIGNIFICANT CHANGE UP (ref 0–6)
GLUCOSE BLDC GLUCOMTR-MCNC: 133 MG/DL — HIGH (ref 70–99)
GLUCOSE BLDC GLUCOMTR-MCNC: 86 MG/DL — SIGNIFICANT CHANGE UP (ref 70–99)
GLUCOSE SERPL-MCNC: 146 MG/DL — HIGH (ref 70–99)
GLUCOSE UR-MCNC: NORMAL
HCG UR QL: 0.2 EU/DL
HCT VFR BLD CALC: 38.4 % — SIGNIFICANT CHANGE UP (ref 34.5–45)
HGB BLD-MCNC: 13.4 G/DL — SIGNIFICANT CHANGE UP (ref 11.5–15.5)
HGB UR QL STRIP.AUTO: NORMAL
IMM GRANULOCYTES NFR BLD AUTO: 0.8 % — SIGNIFICANT CHANGE UP (ref 0–1.5)
KETONES UR-MCNC: NORMAL
LEUKOCYTE ESTERASE UR QL STRIP: ABNORMAL
LYMPHOCYTES # BLD AUTO: 1.61 K/UL — SIGNIFICANT CHANGE UP (ref 1–3.3)
LYMPHOCYTES # BLD AUTO: 14.9 % — SIGNIFICANT CHANGE UP (ref 13–44)
MCHC RBC-ENTMCNC: 31.7 PG — SIGNIFICANT CHANGE UP (ref 27–34)
MCHC RBC-ENTMCNC: 34.9 GM/DL — SIGNIFICANT CHANGE UP (ref 32–36)
MCV RBC AUTO: 90.8 FL — SIGNIFICANT CHANGE UP (ref 80–100)
MONOCYTES # BLD AUTO: 0.59 K/UL — SIGNIFICANT CHANGE UP (ref 0–0.9)
MONOCYTES NFR BLD AUTO: 5.5 % — SIGNIFICANT CHANGE UP (ref 2–14)
NEUTROPHILS # BLD AUTO: 8.46 K/UL — HIGH (ref 1.8–7.4)
NEUTROPHILS NFR BLD AUTO: 78.2 % — HIGH (ref 43–77)
NITRITE UR QL STRIP: NORMAL
PH UR STRIP: 6.5
PLATELET # BLD AUTO: 229 K/UL — SIGNIFICANT CHANGE UP (ref 150–400)
POTASSIUM SERPL-MCNC: 3.6 MMOL/L — SIGNIFICANT CHANGE UP (ref 3.5–5.3)
POTASSIUM SERPL-SCNC: 3.6 MMOL/L — SIGNIFICANT CHANGE UP (ref 3.5–5.3)
PROT SERPL-MCNC: 6.9 G/DL — SIGNIFICANT CHANGE UP (ref 6.6–8.7)
PROT UR STRIP-MCNC: NORMAL
RBC # BLD: 4.23 M/UL — SIGNIFICANT CHANGE UP (ref 3.8–5.2)
RBC # FLD: 12.2 % — SIGNIFICANT CHANGE UP (ref 10.3–14.5)
SARS-COV-2 RNA SPEC QL NAA+PROBE: SIGNIFICANT CHANGE UP
SODIUM SERPL-SCNC: 134 MMOL/L — LOW (ref 135–145)
SP GR UR STRIP: 1.01
WBC # BLD: 10.81 K/UL — HIGH (ref 3.8–10.5)
WBC # FLD AUTO: 10.81 K/UL — HIGH (ref 3.8–10.5)

## 2020-05-27 PROCEDURE — 76818 FETAL BIOPHYS PROFILE W/NST: CPT

## 2020-05-27 PROCEDURE — 0502F SUBSEQUENT PRENATAL CARE: CPT

## 2020-05-27 RX ORDER — GLUCAGON INJECTION, SOLUTION 0.5 MG/.1ML
1 INJECTION, SOLUTION SUBCUTANEOUS ONCE
Refills: 0 | Status: DISCONTINUED | OUTPATIENT
Start: 2020-05-27 | End: 2020-05-28

## 2020-05-27 RX ORDER — DEXTROSE 50 % IN WATER 50 %
15 SYRINGE (ML) INTRAVENOUS ONCE
Refills: 0 | Status: DISCONTINUED | OUTPATIENT
Start: 2020-05-27 | End: 2020-05-28

## 2020-05-27 RX ORDER — DEXTROSE 50 % IN WATER 50 %
25 SYRINGE (ML) INTRAVENOUS ONCE
Refills: 0 | Status: DISCONTINUED | OUTPATIENT
Start: 2020-05-27 | End: 2020-05-28

## 2020-05-27 RX ORDER — DEXTROSE 50 % IN WATER 50 %
12.5 SYRINGE (ML) INTRAVENOUS ONCE
Refills: 0 | Status: DISCONTINUED | OUTPATIENT
Start: 2020-05-27 | End: 2020-05-28

## 2020-05-27 RX ORDER — INSULIN LISPRO 100/ML
VIAL (ML) SUBCUTANEOUS AT BEDTIME
Refills: 0 | Status: DISCONTINUED | OUTPATIENT
Start: 2020-05-27 | End: 2020-05-28

## 2020-05-27 RX ORDER — SODIUM CHLORIDE 9 MG/ML
1000 INJECTION, SOLUTION INTRAVENOUS
Refills: 0 | Status: DISCONTINUED | OUTPATIENT
Start: 2020-05-27 | End: 2020-05-28

## 2020-05-27 RX ORDER — INSULIN LISPRO 100/ML
VIAL (ML) SUBCUTANEOUS
Refills: 0 | Status: DISCONTINUED | OUTPATIENT
Start: 2020-05-27 | End: 2020-05-28

## 2020-05-27 RX ORDER — CITRIC ACID/SODIUM CITRATE 300-500 MG
30 SOLUTION, ORAL ORAL ONCE
Refills: 0 | Status: DISCONTINUED | OUTPATIENT
Start: 2020-05-27 | End: 2020-05-28

## 2020-05-27 RX ORDER — OXYTOCIN 10 UNIT/ML
333.33 VIAL (ML) INJECTION
Qty: 20 | Refills: 0 | Status: COMPLETED | OUTPATIENT
Start: 2020-05-27 | End: 2020-05-27

## 2020-05-27 RX ADMIN — SODIUM CHLORIDE 125 MILLILITER(S): 9 INJECTION, SOLUTION INTRAVENOUS at 22:00

## 2020-05-27 NOTE — OB PROVIDER H&P - NSHPREVIEWOFSYSTEMS_GEN_ALL_CORE
Constitutional: no fever, sweats, and no chills.  Eyes: no pain, no redness, and no visual changes.  ENMT: no ear pain and no hearing problems, no nasal congestion/drainage, no dysphagia, and no throat pain, no neck pain, no stiffness  CV: no chest pain, no edema.  Resp: no cough, no dyspnea  GI:  no bloating, no constipation, no diarrhea, no nausea and no vomiting.  : no dysuria, no hematuria  MSK: no msk pain, no weakness  Skin: no lesions, and no rashes.  Neuro: no LOC, no headache, no sensory deficits, and no weakness.

## 2020-05-27 NOTE — OB PROVIDER H&P - NSHPPHYSICALEXAM_GEN_ALL_CORE
ICU Vital Signs Last 24 Hrs  HR: 96 (27 May 2020 19:49) (96 - 96)  BP: 119/81 (27 May 2020 19:49) (119/81 - 119/81)        General: AOx3, NAD  Heart: RRR  Lungs: CTAB  Abd: Soft, nontender, gravid  SVE:         FHT: bpm - category I tracing.  [place]: Ctxs every  minutes. Vital Signs Last 24 Hrs  T(C): 37 (27 May 2020 19:35), Max: 37 (27 May 2020 19:35)  T(F): 98.6 (27 May 2020 19:35), Max: 98.6 (27 May 2020 19:35)  HR: 96 (27 May 2020 19:49) (96 - 96)  BP: 119/81 (27 May 2020 19:49) (119/81 - 119/81)  RR: 16 (27 May 2020 19:35) (16 - 16)      General: AOx3, NAD  Heart: RRR  Lungs: CTAB  Abd: Soft, nontender, gravid  SVE: Vertex presentation    FHT: bpm - category I tracing.  [place]: Ctxs every  minutes.  Pelvic Exam: 4.5 cm/60% Effacement/-3 station  Ultrasound: Vertex presentation

## 2020-05-27 NOTE — CHART NOTE - NSCHARTNOTEFT_GEN_A_CORE
05-27-20 @ 23:51  Patient was evaluated at bedside.  Patient resting comfortably s/p epidural placement.    ICU Vital Signs Last 24 Hrs  T(C): 37.0 (27 May 2020 23:43), Max: 37 (27 May 2020 19:35)  T(F): 98.6 (27 May 2020 23:43), Max: 98.6 (27 May 2020 19:35)  HR: 80 (27 May 2020 23:49) (75 - 99)  BP: 144/67 (27 May 2020 23:48) (114/63 - 144/67)  RR: 18 (27 May 2020 22:04) (16 - 18)  SpO2: 98% (27 May 2020 23:44) (97% - 99%)    FHT: 125 bpm, moderate variability + accels no decels present - category I tracing  [place]: Irregular  SVE: 5.0/75/-2      PLAN:  Reactive tracing  AROM @ 23:30, scant clear fluid  Will continue with expectant mgmt; if contractions become irregular, will augment with pitocin  Continuous FHT/[place]  Maternal/fetal status reassuring  Will continue to reassess prn.

## 2020-05-27 NOTE — OB RN PATIENT PROFILE - PMH
Diet controlled gestational diabetes mellitus (GDM) in third trimester    Hypothyroidism affecting pregnancy in third trimester    Termination of pregnancy (fetus)  x2  Vaginal delivery  2019

## 2020-05-27 NOTE — OB PROVIDER H&P - HISTORY OF PRESENT ILLNESS
ADALID HDZ is a 30y  at 40w2d from the office today after pelvic exam revealed she was 3-4cm dilated.    Patient denies any fevers, chills, headaches, myalgias, anosmia CP, SOB, or diarrhea. Denies any recent travel or recent sick contacts.     Prenatal course complicated by hypothyroidism and GDMA1    Obhx:  - NSVDx1  - TOPx2  Medhx: none  Surghx: D&C  Meds: Levothyroxine 50mcg qd, PNV  Allergies: Shellfish ADALID HDZ is a 30 y.o.  at 40w2d from the office today after pelvic exam revealed she was 3-4cm dilated. Admits to having painful contractions 4-5 minutes apart. Denies rupture of membranes or vaginal bleeding. Typical fetal movements felt by patient.    Patient denies any fevers, chills, headaches, myalgias, anosmia CP, SOB, or diarrhea. Denies any recent travel or recent sick contacts.     Prenatal course complicated by hypothyroidism and GDMA1    Obhx:  - NSVDx1  - TOPx2  Gynhx: ovarian cyst  Medhx: none  Surghx: D&C  Meds: Levothyroxine 50mcg qd, PNV  Allergies: Shellfish, Be[place], shrimp

## 2020-05-27 NOTE — OB PROVIDER H&P - ASSESSMENT
ADALID HDZ is a 30y  at 40w2d from the office today after pelvic exam revealed she was 3-4cm dilated; admin for augmentation of labor.    PLAN:  - Admin to L&D  - Consent  - Admission labs  - GBS negative  - COVID-19 swab obtained  - Hx of hypothyroidism  - Hx of GDMA-1. Will continue with q4h fingerstick during latent phase and q1-2h fingerstick during active phase.  - Continuous toco/FHT  - Maternal/fetal status reassuring      Plan d/w Dr. Massey ADALID HDZ is a 30y  at 40w2d from the office today after pelvic exam revealed she was 3-4cm dilated with increased painful contractions; admin for augmentation of labor. Rubella I, COVID pending, O+, GBS-.    PLAN:  - Admin to L&D  - Consent  - Admission labs  - GBS negative  - COVID-19 swab obtained  - Hx of hypothyroidism  - Hx of GDMA-1. Will continue with q4h fingerstick during latent phase and q1-2h fingerstick during active phase.  - Continuous toco/FHT  - Maternal/fetal status reassuring      Plan d/w Dr. Massey ADALID HDZ is a 30y  at 40w2d from the office today after pelvic exam revealed she was 3-4cm dilated with increased painful contractions; admin for augmentation of labor. Rubella I, COVID pending, O+, GBS-.    PLAN:  - Admin to L&D  - Consent  - Admission labs  - GBS negative  - COVID-19 swab obtained  - Hx of hypothyroidism  - Hx of GDMA-1. Will continue with q4h fingerstick during latent phase and q1-2h fingerstick during active phase.  - Patient desires epidural for pain management   - Continuous toco/FHT  - Maternal/fetal status reassuring      Plan d/w Dr. Massey

## 2020-05-28 LAB
GLUCOSE BLDC GLUCOMTR-MCNC: 105 MG/DL — HIGH (ref 70–99)
GLUCOSE BLDC GLUCOMTR-MCNC: 75 MG/DL — SIGNIFICANT CHANGE UP (ref 70–99)
T PALLIDUM AB TITR SER: NEGATIVE — SIGNIFICANT CHANGE UP

## 2020-05-28 PROCEDURE — 59400 OBSTETRICAL CARE: CPT

## 2020-05-28 RX ORDER — SODIUM CHLORIDE 9 MG/ML
3 INJECTION INTRAMUSCULAR; INTRAVENOUS; SUBCUTANEOUS EVERY 8 HOURS
Refills: 0 | Status: DISCONTINUED | OUTPATIENT
Start: 2020-05-28 | End: 2020-05-29

## 2020-05-28 RX ORDER — OXYCODONE HYDROCHLORIDE 5 MG/1
5 TABLET ORAL
Refills: 0 | Status: DISCONTINUED | OUTPATIENT
Start: 2020-05-28 | End: 2020-05-29

## 2020-05-28 RX ORDER — IBUPROFEN 200 MG
600 TABLET ORAL EVERY 6 HOURS
Refills: 0 | Status: COMPLETED | OUTPATIENT
Start: 2020-05-28 | End: 2021-04-26

## 2020-05-28 RX ORDER — PRAMOXINE HYDROCHLORIDE 150 MG/15G
1 AEROSOL, FOAM RECTAL EVERY 4 HOURS
Refills: 0 | Status: DISCONTINUED | OUTPATIENT
Start: 2020-05-28 | End: 2020-05-29

## 2020-05-28 RX ORDER — LANOLIN
1 OINTMENT (GRAM) TOPICAL EVERY 6 HOURS
Refills: 0 | Status: DISCONTINUED | OUTPATIENT
Start: 2020-05-28 | End: 2020-05-29

## 2020-05-28 RX ORDER — MAGNESIUM HYDROXIDE 400 MG/1
30 TABLET, CHEWABLE ORAL
Refills: 0 | Status: DISCONTINUED | OUTPATIENT
Start: 2020-05-28 | End: 2020-05-29

## 2020-05-28 RX ORDER — SIMETHICONE 80 MG/1
80 TABLET, CHEWABLE ORAL EVERY 4 HOURS
Refills: 0 | Status: DISCONTINUED | OUTPATIENT
Start: 2020-05-28 | End: 2020-05-29

## 2020-05-28 RX ORDER — ACETAMINOPHEN 500 MG
975 TABLET ORAL
Refills: 0 | Status: DISCONTINUED | OUTPATIENT
Start: 2020-05-28 | End: 2020-05-29

## 2020-05-28 RX ORDER — DIPHENHYDRAMINE HCL 50 MG
25 CAPSULE ORAL EVERY 6 HOURS
Refills: 0 | Status: DISCONTINUED | OUTPATIENT
Start: 2020-05-28 | End: 2020-05-29

## 2020-05-28 RX ORDER — AER TRAVELER 0.5 G/1
1 SOLUTION RECTAL; TOPICAL EVERY 4 HOURS
Refills: 0 | Status: DISCONTINUED | OUTPATIENT
Start: 2020-05-28 | End: 2020-05-29

## 2020-05-28 RX ORDER — HYDROCORTISONE 1 %
1 OINTMENT (GRAM) TOPICAL EVERY 6 HOURS
Refills: 0 | Status: DISCONTINUED | OUTPATIENT
Start: 2020-05-28 | End: 2020-05-29

## 2020-05-28 RX ORDER — OXYTOCIN 10 UNIT/ML
333.33 VIAL (ML) INJECTION
Qty: 20 | Refills: 0 | Status: DISCONTINUED | OUTPATIENT
Start: 2020-05-28 | End: 2020-05-29

## 2020-05-28 RX ORDER — OXYCODONE HYDROCHLORIDE 5 MG/1
5 TABLET ORAL ONCE
Refills: 0 | Status: DISCONTINUED | OUTPATIENT
Start: 2020-05-28 | End: 2020-05-29

## 2020-05-28 RX ORDER — IBUPROFEN 200 MG
600 TABLET ORAL EVERY 6 HOURS
Refills: 0 | Status: DISCONTINUED | OUTPATIENT
Start: 2020-05-28 | End: 2020-05-29

## 2020-05-28 RX ORDER — SODIUM CHLORIDE 9 MG/ML
1000 INJECTION, SOLUTION INTRAVENOUS
Refills: 0 | Status: DISCONTINUED | OUTPATIENT
Start: 2020-05-28 | End: 2020-05-29

## 2020-05-28 RX ORDER — KETOROLAC TROMETHAMINE 30 MG/ML
30 SYRINGE (ML) INJECTION ONCE
Refills: 0 | Status: DISCONTINUED | OUTPATIENT
Start: 2020-05-28 | End: 2020-05-29

## 2020-05-28 RX ORDER — DIBUCAINE 1 %
1 OINTMENT (GRAM) RECTAL EVERY 6 HOURS
Refills: 0 | Status: DISCONTINUED | OUTPATIENT
Start: 2020-05-28 | End: 2020-05-29

## 2020-05-28 RX ORDER — BENZOCAINE 10 %
1 GEL (GRAM) MUCOUS MEMBRANE EVERY 6 HOURS
Refills: 0 | Status: DISCONTINUED | OUTPATIENT
Start: 2020-05-28 | End: 2020-05-29

## 2020-05-28 RX ORDER — TETANUS TOXOID, REDUCED DIPHTHERIA TOXOID AND ACELLULAR PERTUSSIS VACCINE, ADSORBED 5; 2.5; 8; 8; 2.5 [IU]/.5ML; [IU]/.5ML; UG/.5ML; UG/.5ML; UG/.5ML
0.5 SUSPENSION INTRAMUSCULAR ONCE
Refills: 0 | Status: DISCONTINUED | OUTPATIENT
Start: 2020-05-28 | End: 2020-05-29

## 2020-05-28 RX ORDER — OXYTOCIN 10 UNIT/ML
2 VIAL (ML) INJECTION
Qty: 30 | Refills: 0 | Status: DISCONTINUED | OUTPATIENT
Start: 2020-05-28 | End: 2020-05-28

## 2020-05-28 RX ADMIN — Medication 600 MILLIGRAM(S): at 12:06

## 2020-05-28 RX ADMIN — Medication 975 MILLIGRAM(S): at 20:27

## 2020-05-28 RX ADMIN — Medication 975 MILLIGRAM(S): at 15:41

## 2020-05-28 RX ADMIN — Medication 2 MILLIUNIT(S)/MIN: at 02:29

## 2020-05-28 RX ADMIN — Medication 1000 MILLIUNIT(S)/MIN: at 05:39

## 2020-05-28 RX ADMIN — Medication 600 MILLIGRAM(S): at 18:37

## 2020-05-28 RX ADMIN — SODIUM CHLORIDE 125 MILLILITER(S): 9 INJECTION, SOLUTION INTRAVENOUS at 02:00

## 2020-05-28 RX ADMIN — Medication 1 TABLET(S): at 12:07

## 2020-05-28 RX ADMIN — SODIUM CHLORIDE 3 MILLILITER(S): 9 INJECTION INTRAMUSCULAR; INTRAVENOUS; SUBCUTANEOUS at 14:00

## 2020-05-28 RX ADMIN — Medication 2 MILLIUNIT(S)/MIN: at 02:30

## 2020-05-28 NOTE — OB RN DELIVERY SUMMARY - NS_SK[place]INA_OBGYN_ALL_OB
No apparent complications or complaints regarding anesthesia care at this time/All questions were answered
Was done for at least one hour

## 2020-05-28 NOTE — CHART NOTE - NSCHARTNOTEFT_GEN_A_CORE
Patient a  here at 40.3 wks GA here AROM @23:30 currently s/p epidural and comfortable.     Vital Signs Last 24 Hrs  T(C): 36.3 (28 May 2020 01:44), Max: 37 (27 May 2020 19:35)  T(F): 97.34 (28 May 2020 01:44), Max: 98.6 (27 May 2020 19:35)  HR: 70 (28 May 2020 02:19) (67 - 99)  BP: 126/69 (28 May 2020 02:18) (114/63 - 144/67)  BP(mean): --  RR: 16 (28 May 2020 02:04) (16 - 18)  SpO2: 100% (28 May 2020 02:19) (96% - 100%)    FETAL HEART RATE:120/mod kris/+accels/no decels   [address]: q  5 min    CERVICAL EXAM: 5/90/-1      IMPRESSION: Not making cervical change with ROM alone and infrequent contractions. FHT cat 1, reassuring. Will start pitocin for more regular contraction.     discussed with Dr. Massey

## 2020-05-28 NOTE — OB PROVIDER DELIVERY SUMMARY - NSPROVIDERDELIVERYNOTE_OBGYN_ALL_OB_FT
Vaginal Delivery Summary    Procedure: Normal spontaneous vaginal delivery   Findings: Viable male infant delivered in cephalic presentation at 05:38, placenta delivered at 05:46.  Apgar scores 9/9.   Weight will be recorded after 1 hour to allow for skin-to-skin contact.  Laceration(s): 2nd degree perineal laceration  Repair: 3-0 chromic, 2-0 chromic  EBL: 150 mL  Complications: None    Procedure:   Patient felt rectal pressure and was found to be fully dilated, +2 station. She pushed effectively for 15 minutes. She delivered a viable male infant. Delayed cord clamping was performed for 30 seconds. Placenta delivered intact and pitocin was started at the delivery of placenta. Perineum and vagina were inspected, 2nd degree laceration present and repaired. Adequate hemostasis was obtained. Apgar 3/3. Uterus fundus noted to be firm.

## 2020-05-28 NOTE — OB RN DELIVERY SUMMARY - NS_SEPSISRSKCALC_OBGYN_ALL_OB_FT
EOS calculated successfully. EOS Risk Factor: 0.5/1000 live births ([facility] national incidence); GA=40w3d; Temp=98.6; ROM=6.1; GBS='Negative'; Antibiotics='No antibiotics or any antibiotics < 2 hrs prior to birth'

## 2020-05-28 NOTE — CHART NOTE - NSCHARTNOTESELECT_GEN_ALL_CORE
<p>Prior to commencing surgery patient identification, surgical procedure, site, and side were confirmed by Dr. Gen Bonilla. Following topical proparacaine anesthesia, the patient was positioned at the YAG laser, a contact lens coupled to the cornea with methylcellulose and an axial posterior capsulotomy performed without complication using 2.7 Mj x 27. One drop of Alphagan was instilled and the patient returned to the holding area having tolerated the procedure well and without complication. </p><p>MRN:[id number]</p>
LABOR NOTE/Event Note
Labor note

## 2020-05-29 ENCOUNTER — TRANSCRIPTION ENCOUNTER (OUTPATIENT)
Age: 30
End: 2020-05-29

## 2020-05-29 VITALS
TEMPERATURE: 98 F | SYSTOLIC BLOOD PRESSURE: 126 MMHG | HEART RATE: 76 BPM | DIASTOLIC BLOOD PRESSURE: 80 MMHG | RESPIRATION RATE: 18 BRPM

## 2020-05-29 DIAGNOSIS — O99.283 ENDOCRINE, NUTRITIONAL AND METABOLIC DISEASES COMPLICATING PREGNANCY, THIRD TRIMESTER: ICD-10-CM

## 2020-05-29 PROCEDURE — 86850 RBC ANTIBODY SCREEN: CPT

## 2020-05-29 PROCEDURE — 80053 COMPREHEN METABOLIC PANEL: CPT

## 2020-05-29 PROCEDURE — 36415 COLL VENOUS BLD VENIPUNCTURE: CPT

## 2020-05-29 PROCEDURE — 86901 BLOOD TYPING SEROLOGIC RH(D): CPT

## 2020-05-29 PROCEDURE — 87635 SARS-COV-2 COVID-19 AMP PRB: CPT

## 2020-05-29 PROCEDURE — 82962 GLUCOSE BLOOD TEST: CPT

## 2020-05-29 PROCEDURE — 86780 TREPONEMA PALLIDUM: CPT

## 2020-05-29 PROCEDURE — 86900 BLOOD TYPING SEROLOGIC ABO: CPT

## 2020-05-29 PROCEDURE — 85027 COMPLETE CBC AUTOMATED: CPT

## 2020-05-29 RX ORDER — DOCUSATE SODIUM 100 MG
1 CAPSULE ORAL
Qty: 28 | Refills: 0
Start: 2020-05-29 | End: 2020-06-11

## 2020-05-29 RX ORDER — IBUPROFEN 200 MG
1 TABLET ORAL
Qty: 16 | Refills: 0
Start: 2020-05-29

## 2020-05-29 RX ORDER — LEVOTHYROXINE SODIUM 125 MCG
50 TABLET ORAL DAILY
Refills: 0 | Status: DISCONTINUED | OUTPATIENT
Start: 2020-05-29 | End: 2020-05-29

## 2020-05-29 RX ADMIN — Medication 1 TABLET(S): at 11:24

## 2020-05-29 RX ADMIN — Medication 975 MILLIGRAM(S): at 08:52

## 2020-05-29 RX ADMIN — Medication 50 MICROGRAM(S): at 06:17

## 2020-05-29 RX ADMIN — Medication 600 MILLIGRAM(S): at 11:24

## 2020-05-29 RX ADMIN — Medication 600 MILLIGRAM(S): at 02:12

## 2020-05-29 NOTE — DISCHARGE NOTE OB - CARE PROVIDER_API CALL
Lorena Robins  OBSTETRICS AND GYNECOLOGY  [address] [place] [address]  Phone: [phone]  Fax: [phone]  Follow Up Time:

## 2020-05-29 NOTE — DISCHARGE NOTE OB - MEDICATION SUMMARY - MEDICATIONS TO TAKE
I will START or STAY ON the medications listed below when I get home from the hospital:    ibuprofen 600 mg oral tablet  -- 1 tab(s) by mouth every 8 hours   -- Do not take this drug if you are pregnant.  It is very important that you take or use this exactly as directed.  Do not skip doses or discontinue unless directed by your doctor.  May cause drowsiness or dizziness.  Obtain medical advice before taking any non-prescription drugs as some may affect the action of this medication.  Take with food or milk.    -- Indication: For pain    Anita-Sequels (as elemental iron) 65 mg-25 mg oral tablet  -- 1 tab(s) by mouth once a day   -- Check with your doctor before becoming pregnant.  Do not take dairy products, antacids, or iron preparations within one hour of this medication.  May discolor urine or feces.  Obtain medical advice before taking any non-prescription drugs as some may affect the action of this medication.    -- Indication: For blood loss    Colace 100 mg oral capsule  -- 1 cap(s) by mouth 2 times a day   -- Medication should be taken with plenty of water.    -- Indication: For constipation

## 2020-05-29 NOTE — PROGRESS NOTE ADULT - SUBJECTIVE AND OBJECTIVE BOX
MRN: [id number]  Date Admitted: 20  Location: [facility] [id number] ([facility] 2EST)  Attending: Lorena Robins      Post Partum Progress Note: Vaginal delivery       ADALID HDZ is a 30y  s/p vaginal delivery @40w2d now PPD #1. MALE INFANT, DESIRES CIRCUMCISION  - COVID-19 Negative    SUBJECTIVE:  Patient was seen and examined at bedside.   No acute events overnight per nursing.   Reports feeling well this AM. Patient is bonding with infant.   Pain is well controlled with PRN pain medication.   Tolerating a regular diet. Denies nausea/vomiting.  + Flatus. No BM.   Voiding spontaneously. Ambulating without assistance.   She is breastfeeding and the baby is latching on.  Denies fevers, chills, SOB, CP, HA, vision changes or calf pain.      OBJECTIVE:  Physical exam:  General: AOx3, NAD.  Heart: RRR. S1S2.  Lungs: CTABL. Good airflow bilaterally.   Abdomen: +BS, Soft, appropriately tender, nondistended, no guarding or rebound tenderness, firm uterine fundus at umbilicus  VE: +Lochia  Ext: No DVT signs, warm extremities.    Vital Signs Last 24 Hrs  T(C): 36.9 (28 May 2020 19:55), Max: 36.9 (28 May 2020 19:55)  T(F): 98.4 (28 May 2020 19:55), Max: 98.4 (28 May 2020 19:55)  HR: 74 (28 May 2020 19:55) (68 - 130)  BP: 126/74 (28 May 2020 19:55) (120/63 - 134/70)  BP(mean): 90 (28 May 2020 08:01) (84 - 90)  RR: 18 (28 May 2020 19:55) (16 - 18)  SpO2: 99% (28 May 2020 08:01) (93% - 99%)    LABS:                        13.4   10.81 )-----------( 229      ( 27 May 2020 20:11 )             38.4

## 2020-05-29 NOTE — DISCHARGE NOTE OB - HOSPITAL COURSE
She is a 29 yo now  who presented at 40.2 wks GA for induction of labor and had a normal delivery complicated by a second degree laceration. She had a normal postpartum course and was discharged home in stable condition on postpartum day 1.

## 2020-05-29 NOTE — PROGRESS NOTE ADULT - PROBLEM SELECTOR PLAN 1
Patient feels well  Hgb: 13.4  Continue the current pain medication  Encourage  Ambulation  Encourage regular diet  DVT ppx: SCDs only when not ambulating  Healthy male infant; desires circumcision  Plan for discharge today pending attending assessment

## 2020-05-29 NOTE — DISCHARGE NOTE OB - MATERIALS PROVIDED
Vaccinations/[facility]  Screening Program/  Immunization Record/Breastfeeding Log/Breastfeeding Mother’s Support Group Information/Guide to Postpartum Care/[facility] Hearing Screen Program/Back To Sleep Handout/Shaken Baby Prevention Handout/Breastfeeding Guide and Packet/Birth Certificate Instructions/Discharge Medication Information for Patients and Families Pocket Guide/Tdap Vaccination (VIS Pub Date: 2012)

## 2020-05-29 NOTE — DISCHARGE NOTE OB - CARE PLAN
Principal Discharge DX:	Vaginal delivery  Goal:	Rapid recovery  Assessment and plan of treatment:	Patient should transition to regular activity level. Resume regular diet. Patient should follow up with her OB for a postpartum checkup 2 weeks after delivery. Patient should call her doctor sooner if she develops a fever or uncontrolled vaginal bleeding or fevers. Please call sooner if there are any other concerns.

## 2020-05-29 NOTE — PROGRESS NOTE ADULT - ATTENDING COMMENTS
Patient seen and examined. Agree with resident note today. PPD1 s/p  at term, Rh+ / COVD - / male infant desires circumcision, consented. GDM and hypothyroidism. VSS, exam appropriate, continue routine care. DC home today after infant circumcision if all well.

## 2020-05-29 NOTE — DISCHARGE NOTE OB - PATIENT PORTAL LINK FT
You can access the FollowMyHealth Patient Portal offered by [facility] by registering at the following website: [email or web address]. By joining [facility]’s FollowMyHealth portal, you will also be able to view your health information using other applications (apps) compatible with our system.

## 2020-06-04 ENCOUNTER — RX RENEWAL (OUTPATIENT)
Age: 30
End: 2020-06-04

## 2020-06-11 ENCOUNTER — APPOINTMENT (OUTPATIENT)
Dept: OBGYN | Facility: CLINIC | Age: 30
End: 2020-06-11
Payer: COMMERCIAL

## 2020-06-11 PROCEDURE — 99441: CPT

## 2020-07-09 ENCOUNTER — APPOINTMENT (OUTPATIENT)
Dept: OBGYN | Facility: CLINIC | Age: 30
End: 2020-07-09
Payer: COMMERCIAL

## 2020-07-09 VITALS
DIASTOLIC BLOOD PRESSURE: 68 MMHG | WEIGHT: 129 LBS | SYSTOLIC BLOOD PRESSURE: 110 MMHG | HEIGHT: 63 IN | BODY MASS INDEX: 22.86 KG/M2

## 2020-07-09 DIAGNOSIS — Z87.898 PERSONAL HISTORY OF OTHER SPECIFIED CONDITIONS: ICD-10-CM

## 2020-07-09 DIAGNOSIS — Z3A.18 18 WEEKS GESTATION OF PREGNANCY: ICD-10-CM

## 2020-07-09 DIAGNOSIS — Z3A.20 20 WEEKS GESTATION OF PREGNANCY: ICD-10-CM

## 2020-07-09 DIAGNOSIS — Z3A.32 32 WEEKS GESTATION OF PREGNANCY: ICD-10-CM

## 2020-07-09 DIAGNOSIS — R10.2 OTHER SPECIFIED PREGNANCY RELATED CONDITIONS, UNSPECIFIED TRIMESTER: ICD-10-CM

## 2020-07-09 DIAGNOSIS — O26.899 OTHER SPECIFIED PREGNANCY RELATED CONDITIONS, UNSPECIFIED TRIMESTER: ICD-10-CM

## 2020-07-09 DIAGNOSIS — Z13.31 ENCOUNTER FOR SCREENING FOR DEPRESSION: ICD-10-CM

## 2020-07-09 DIAGNOSIS — Z34.92 ENCOUNTER FOR SUPERVISION OF NORMAL PREGNANCY, UNSPECIFIED, SECOND TRIMESTER: ICD-10-CM

## 2020-07-09 DIAGNOSIS — Z3A.23 23 WEEKS GESTATION OF PREGNANCY: ICD-10-CM

## 2020-07-09 DIAGNOSIS — Z34.93 ENCOUNTER FOR SUPERVISION OF NORMAL PREGNANCY, UNSPECIFIED, THIRD TRIMESTER: ICD-10-CM

## 2020-07-09 DIAGNOSIS — Z12.4 ENCOUNTER FOR SCREENING FOR MALIGNANT NEOPLASM OF CERVIX: ICD-10-CM

## 2020-07-09 DIAGNOSIS — Z30.9 ENCOUNTER FOR CONTRACEPTIVE MANAGEMENT, UNSPECIFIED: ICD-10-CM

## 2020-07-09 DIAGNOSIS — O24.410 GESTATIONAL DIABETES MELLITUS IN PREGNANCY, DIET CONTROLLED: ICD-10-CM

## 2020-07-09 PROCEDURE — 0503F POSTPARTUM CARE VISIT: CPT

## 2020-07-09 RX ORDER — BLOOD SUGAR DIAGNOSTIC
STRIP MISCELLANEOUS 4 TIMES DAILY
Qty: 3 | Refills: 3 | Status: DISCONTINUED | COMMUNITY
Start: 2019-11-23 | End: 2020-07-09

## 2020-07-09 RX ORDER — LANCETS 28 GAUGE
EACH MISCELLANEOUS
Qty: 1 | Refills: 3 | Status: DISCONTINUED | COMMUNITY
Start: 2019-11-23 | End: 2020-07-09

## 2020-07-09 RX ORDER — URINE ACETONE TEST STRIPS
STRIP MISCELLANEOUS
Qty: 1 | Refills: 0 | Status: DISCONTINUED | COMMUNITY
Start: 2019-11-23 | End: 2020-07-09

## 2020-07-09 RX ORDER — BLOOD-GLUCOSE METER
W/DEVICE KIT MISCELLANEOUS
Qty: 1 | Refills: 0 | Status: DISCONTINUED | COMMUNITY
Start: 2019-11-23 | End: 2020-07-09

## 2020-08-07 ENCOUNTER — TRANSCRIPTION ENCOUNTER (OUTPATIENT)
Age: 30
End: 2020-08-07

## 2020-08-07 LAB
GLUCOSE 1H P 100 G GLC PO SERPL-MCNC: 182 MG/DL
GLUCOSE 2H P 100 G GLC PO SERPL-MCNC: 112 MG/DL
GLUCOSE BS SERPL-MCNC: 93 MG/DL
SARS-COV-2 IGG SERPL IA-ACNC: 0.08 INDEX
SARS-COV-2 IGG SERPL QL IA: NEGATIVE

## 2020-08-19 ENCOUNTER — APPOINTMENT (OUTPATIENT)
Dept: OBGYN | Facility: CLINIC | Age: 30
End: 2020-08-19
Payer: COMMERCIAL

## 2020-08-19 VITALS
HEIGHT: 63 IN | BODY MASS INDEX: 21.62 KG/M2 | TEMPERATURE: 97.3 F | DIASTOLIC BLOOD PRESSURE: 70 MMHG | WEIGHT: 122 LBS | SYSTOLIC BLOOD PRESSURE: 110 MMHG

## 2020-08-19 DIAGNOSIS — Z01.419 ENCOUNTER FOR GYNECOLOGICAL EXAMINATION (GENERAL) (ROUTINE) W/OUT ABNORMAL FINDINGS: ICD-10-CM

## 2020-08-19 LAB
HCG UR QL: NEGATIVE
QUALITY CONTROL: YES

## 2020-08-19 PROCEDURE — 81025 URINE PREGNANCY TEST: CPT

## 2020-08-19 PROCEDURE — 99395 PREV VISIT EST AGE 18-39: CPT

## 2020-08-19 RX ORDER — NORETHINDRONE 0.35 MG/1
0.35 TABLET ORAL DAILY
Qty: 3 | Refills: 3 | Status: DISCONTINUED | COMMUNITY
Start: 2020-07-09 | End: 2020-08-19

## 2020-08-31 LAB
C TRACH RRNA SPEC QL NAA+PROBE: NOT DETECTED
CYTOLOGY CVX/VAG DOC THIN PREP: NORMAL
HPV HIGH+LOW RISK DNA PNL CVX: NOT DETECTED
N GONORRHOEA RRNA SPEC QL NAA+PROBE: NOT DETECTED
SOURCE TP AMPLIFICATION: NORMAL

## 2020-09-18 ENCOUNTER — RX RENEWAL (OUTPATIENT)
Age: 30
End: 2020-09-18

## 2020-10-13 LAB
T3FREE SERPL-MCNC: 2.37 PG/ML
T4 FREE SERPL-MCNC: 1.1 NG/DL
TSH SERPL-ACNC: 0.04 UIU/ML

## 2020-10-15 ENCOUNTER — APPOINTMENT (OUTPATIENT)
Dept: ENDOCRINOLOGY | Facility: CLINIC | Age: 30
End: 2020-10-15
Payer: COMMERCIAL

## 2020-10-15 VITALS
BODY MASS INDEX: 21.62 KG/M2 | DIASTOLIC BLOOD PRESSURE: 72 MMHG | HEIGHT: 63 IN | SYSTOLIC BLOOD PRESSURE: 112 MMHG | HEART RATE: 74 BPM | WEIGHT: 122 LBS

## 2020-10-15 DIAGNOSIS — Z86.32 PERSONAL HISTORY OF GESTATIONAL DIABETES: ICD-10-CM

## 2020-10-15 PROCEDURE — 99214 OFFICE O/P EST MOD 30 MIN: CPT

## 2020-10-15 RX ORDER — LEVOTHYROXINE SODIUM 0.05 MG/1
50 TABLET ORAL
Refills: 0 | Status: DISCONTINUED | COMMUNITY
End: 2020-10-15

## 2020-12-23 PROBLEM — Z01.419 ENCOUNTER FOR GYNECOLOGICAL EXAMINATION WITHOUT ABNORMAL FINDING: Status: RESOLVED | Noted: 2020-03-24 | Resolved: 2020-12-23

## 2021-02-01 ENCOUNTER — RX RENEWAL (OUTPATIENT)
Age: 31
End: 2021-02-01

## 2021-02-04 NOTE — OB RN DELIVERY SUMMARY - BABY A: DATE/TIME OF DELIVERY
[facility]  Discharge Note    Pt discharged with followings belongings: All Valuables sent home with patient. Security envelope number: patient   Patient went home by family  Medications sent to patient pharmacy  Denied suicidal thoughts or thoughts of harming others at time of D/C. Discharged to home    Patient education on aftercare instructions, states understanding and signed discharge AVS, copy given to patient.          Status EXAM upon discharge:  Status and Exam  Normal: Yes  Facial Expression: Flat  Affect: Appropriate  Level of Consciousness: Alert  Mood:Normal: Yes  Mood: Depressed  Motor Activity:Normal: Yes  Motor Activity: Decreased  Interview Behavior: Cooperative  Preception: [place] to Person, [place] to Time, [place] to Place, [place] to Situation  Attention:Normal: Yes  Attention: Distractible  Thought Processes: Circumstantial  Thought Content:Normal: Yes  Hallucinations: None  Delusions: No  Memory:Normal: Yes  Insight and Judgment: No  Insight and Judgment: Poor Judgment, Poor Insight  Present Suicidal Ideation: No  Present Homicidal Ideation: No    Andi Morataya RN 22-Feb-2019 15:38

## 2021-04-12 ENCOUNTER — LABORATORY RESULT (OUTPATIENT)
Age: 31
End: 2021-04-12

## 2021-04-13 LAB
COVID-19 NUCLEOCAPSID  GAM ANTIBODY INTERPRETATION: NEGATIVE
COVID-19 SPIKE DOMAIN ANTIBODY INTERPRETATION: NEGATIVE
SARS-COV-2 AB SERPL IA-ACNC: 0.4 U/ML
SARS-COV-2 AB SERPL QL IA: 0.09 INDEX

## 2021-04-16 ENCOUNTER — APPOINTMENT (OUTPATIENT)
Dept: ENDOCRINOLOGY | Facility: CLINIC | Age: 31
End: 2021-04-16
Payer: COMMERCIAL

## 2021-04-16 VITALS
DIASTOLIC BLOOD PRESSURE: 70 MMHG | HEIGHT: 63 IN | SYSTOLIC BLOOD PRESSURE: 110 MMHG | WEIGHT: 126 LBS | OXYGEN SATURATION: 97 % | HEART RATE: 72 BPM | BODY MASS INDEX: 22.32 KG/M2

## 2021-04-16 DIAGNOSIS — O99.280 ENDOCRINE, NUTRITIONAL AND METABOLIC DISEASES COMPLICATING PREGNANCY, UNSPECIFIED TRIMESTER: ICD-10-CM

## 2021-04-16 DIAGNOSIS — E03.9 ENDOCRINE, NUTRITIONAL AND METABOLIC DISEASES COMPLICATING PREGNANCY, UNSPECIFIED TRIMESTER: ICD-10-CM

## 2021-04-16 PROCEDURE — 99213 OFFICE O/P EST LOW 20 MIN: CPT

## 2021-04-16 PROCEDURE — 99072 ADDL SUPL MATRL&STAF TM PHE: CPT

## 2021-04-16 RX ORDER — VITAMIN C, CALCIUM, IRON, VITAMIN D3, VITAMIN E, VITAMIN B1, VITAMIN B2, VITAMIN B3, VITAMIN B6, FOLIC ACID, IODINE, ZINC, COPPER, DOCUSATE SODIUM, DOCOSAHEXAENOIC ACID (DHA) 27-1-50 MG
KIT ORAL
Refills: 0 | Status: DISCONTINUED | COMMUNITY
End: 2021-04-16
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
## 2021-09-24 ENCOUNTER — NON-APPOINTMENT (OUTPATIENT)
Age: 31
End: 2021-09-24

## 2021-09-24 ENCOUNTER — APPOINTMENT (OUTPATIENT)
Dept: OBGYN | Facility: CLINIC | Age: 31
End: 2021-09-24
Payer: COMMERCIAL

## 2021-09-24 VITALS
SYSTOLIC BLOOD PRESSURE: 112 MMHG | HEIGHT: 63 IN | BODY MASS INDEX: 21.62 KG/M2 | WEIGHT: 122 LBS | DIASTOLIC BLOOD PRESSURE: 72 MMHG

## 2021-09-24 DIAGNOSIS — Z11.3 ENCOUNTER FOR SCREENING FOR INFECTIONS WITH A PREDOMINANTLY SEXUAL MODE OF TRANSMISSION: ICD-10-CM

## 2021-09-24 DIAGNOSIS — Z20.822 CONTACT WITH AND (SUSPECTED) EXPOSURE TO COVID-19: ICD-10-CM

## 2021-09-24 DIAGNOSIS — Z01.419 ENCOUNTER FOR GYNECOLOGICAL EXAMINATION (GENERAL) (ROUTINE) W/OUT ABNORMAL FINDINGS: ICD-10-CM

## 2021-09-24 PROCEDURE — 99395 PREV VISIT EST AGE 18-39: CPT

## 2021-09-24 PROCEDURE — 36415 COLL VENOUS BLD VENIPUNCTURE: CPT

## 2021-09-24 PROCEDURE — 81025 URINE PREGNANCY TEST: CPT

## 2021-09-30 ENCOUNTER — RX RENEWAL (OUTPATIENT)
Age: 31
End: 2021-09-30

## 2021-10-01 ENCOUNTER — NON-APPOINTMENT (OUTPATIENT)
Age: 31
End: 2021-10-01

## 2021-10-01 LAB
C TRACH RRNA SPEC QL NAA+PROBE: NOT DETECTED
CYTOLOGY CVX/VAG DOC THIN PREP: NORMAL
HAV IGM SER QL: NONREACTIVE
HBV CORE IGM SER QL: NONREACTIVE
HBV SURFACE AG SER QL: NONREACTIVE
HCG UR QL: NEGATIVE
HCV AB SER QL: NONREACTIVE
HCV S/CO RATIO: 0.2 S/CO
HIV1+2 AB SPEC QL IA.RAPID: NONREACTIVE
HPV HIGH+LOW RISK DNA PNL CVX: NOT DETECTED
HSV 1+2 IGG SER IA-IMP: NEGATIVE
HSV 1+2 IGG SER IA-IMP: NEGATIVE
HSV1 IGG SER QL: 0.17 INDEX
HSV2 IGG SER QL: 0.06 INDEX
N GONORRHOEA RRNA SPEC QL NAA+PROBE: NOT DETECTED
PROLACTIN SERPL-MCNC: 11.6 NG/ML
QUALITY CONTROL: YES
SOURCE AMPLIFICATION: NORMAL
SOURCE TP AMPLIFICATION: NORMAL
T PALLIDUM AB SER QL IA: NEGATIVE
T VAGINALIS RRNA SPEC QL NAA+PROBE: NOT DETECTED
T3FREE SERPL-MCNC: 2.65 PG/ML
T4 FREE SERPL-MCNC: 1.5 NG/DL
TSH SERPL-ACNC: 2.78 UIU/ML

## 2021-10-05 ENCOUNTER — RESULT REVIEW (OUTPATIENT)
Age: 31
End: 2021-10-05

## 2021-10-05 ENCOUNTER — APPOINTMENT (OUTPATIENT)
Dept: ULTRASOUND IMAGING | Facility: CLINIC | Age: 31
End: 2021-10-05
Payer: COMMERCIAL

## 2021-10-05 ENCOUNTER — OUTPATIENT (OUTPATIENT)
Dept: OUTPATIENT SERVICES | Facility: HOSPITAL | Age: 31
LOS: 1 days | End: 2021-10-05
Payer: COMMERCIAL

## 2021-10-05 DIAGNOSIS — N64.3 GALACTORRHEA NOT ASSOCIATED WITH CHILDBIRTH: ICD-10-CM

## 2021-10-05 PROCEDURE — 76641 ULTRASOUND BREAST COMPLETE: CPT

## 2021-10-05 PROCEDURE — 76641 ULTRASOUND BREAST COMPLETE: CPT | Mod: 26,50

## 2021-10-06 ENCOUNTER — NON-APPOINTMENT (OUTPATIENT)
Age: 31
End: 2021-10-06

## 2021-10-06 DIAGNOSIS — N64.3 GALACTORRHEA NOT ASSOCIATED WITH CHILDBIRTH: ICD-10-CM

## 2021-10-06 LAB
COVID-19 NUCLEOCAPSID  GAM ANTIBODY INTERPRETATION: POSITIVE
COVID-19 SPIKE DOMAIN ANTIBODY INTERPRETATION: POSITIVE
SARS-COV-2 AB SERPL IA-ACNC: >250 U/ML
SARS-COV-2 AB SERPL QL IA: 39.3 INDEX

## 2021-10-08 ENCOUNTER — APPOINTMENT (OUTPATIENT)
Dept: MAMMOGRAPHY | Facility: CLINIC | Age: 31
End: 2021-10-08
Payer: COMMERCIAL

## 2021-10-08 ENCOUNTER — RESULT REVIEW (OUTPATIENT)
Age: 31
End: 2021-10-08

## 2021-10-08 ENCOUNTER — OUTPATIENT (OUTPATIENT)
Dept: OUTPATIENT SERVICES | Facility: HOSPITAL | Age: 31
LOS: 1 days | End: 2021-10-08
Payer: COMMERCIAL

## 2021-10-08 DIAGNOSIS — Z00.8 ENCOUNTER FOR OTHER GENERAL EXAMINATION: ICD-10-CM

## 2021-10-08 PROCEDURE — 77066 DX MAMMO INCL CAD BI: CPT | Mod: 26

## 2021-10-08 PROCEDURE — 77066 DX MAMMO INCL CAD BI: CPT

## 2021-10-08 PROCEDURE — G0279: CPT | Mod: 26

## 2021-10-08 PROCEDURE — G0279: CPT

## 2021-10-29 ENCOUNTER — APPOINTMENT (OUTPATIENT)
Dept: OBGYN | Facility: CLINIC | Age: 31
End: 2021-10-29

## 2021-11-12 DIAGNOSIS — N64.52 NIPPLE DISCHARGE: ICD-10-CM

## 2021-11-15 ENCOUNTER — APPOINTMENT (OUTPATIENT)
Dept: MRI IMAGING | Facility: CLINIC | Age: 31
End: 2021-11-15
Payer: COMMERCIAL

## 2021-11-15 ENCOUNTER — LABORATORY RESULT (OUTPATIENT)
Age: 31
End: 2021-11-15

## 2021-11-15 ENCOUNTER — OUTPATIENT (OUTPATIENT)
Dept: OUTPATIENT SERVICES | Facility: HOSPITAL | Age: 31
LOS: 1 days | End: 2021-11-15

## 2021-11-15 ENCOUNTER — OUTPATIENT (OUTPATIENT)
Dept: OUTPATIENT SERVICES | Facility: HOSPITAL | Age: 31
LOS: 1 days | End: 2021-11-15
Payer: COMMERCIAL

## 2021-11-15 DIAGNOSIS — Z00.8 ENCOUNTER FOR OTHER GENERAL EXAMINATION: ICD-10-CM

## 2021-11-15 DIAGNOSIS — N64.52 NIPPLE DISCHARGE: ICD-10-CM

## 2021-11-15 PROCEDURE — A9585: CPT

## 2021-11-15 PROCEDURE — 77049 MRI BREAST C-+ W/CAD BI: CPT | Mod: 26

## 2021-11-15 PROCEDURE — C8937: CPT

## 2021-11-15 PROCEDURE — C8908: CPT

## 2021-11-16 ENCOUNTER — NON-APPOINTMENT (OUTPATIENT)
Age: 31
End: 2021-11-16

## 2021-11-17 ENCOUNTER — APPOINTMENT (OUTPATIENT)
Dept: ENDOCRINOLOGY | Facility: CLINIC | Age: 31
End: 2021-11-17
Payer: COMMERCIAL

## 2021-11-17 VITALS
WEIGHT: 120 LBS | OXYGEN SATURATION: 98 % | DIASTOLIC BLOOD PRESSURE: 70 MMHG | HEART RATE: 102 BPM | BODY MASS INDEX: 21.26 KG/M2 | SYSTOLIC BLOOD PRESSURE: 110 MMHG | HEIGHT: 63 IN

## 2021-11-17 PROCEDURE — 99214 OFFICE O/P EST MOD 30 MIN: CPT

## 2021-11-26 ENCOUNTER — APPOINTMENT (OUTPATIENT)
Dept: ULTRASOUND IMAGING | Facility: CLINIC | Age: 31
End: 2021-11-26
Payer: COMMERCIAL

## 2021-11-26 ENCOUNTER — OUTPATIENT (OUTPATIENT)
Dept: OUTPATIENT SERVICES | Facility: HOSPITAL | Age: 31
LOS: 1 days | End: 2021-11-26

## 2021-11-26 DIAGNOSIS — E06.3 AUTOIMMUNE THYROIDITIS: ICD-10-CM

## 2021-11-26 PROCEDURE — 76536 US EXAM OF HEAD AND NECK: CPT | Mod: 26

## 2021-11-29 ENCOUNTER — RESULT REVIEW (OUTPATIENT)
Age: 31
End: 2021-11-29

## 2021-11-29 ENCOUNTER — APPOINTMENT (OUTPATIENT)
Dept: MRI IMAGING | Facility: CLINIC | Age: 31
End: 2021-11-29
Payer: COMMERCIAL

## 2021-11-29 ENCOUNTER — OUTPATIENT (OUTPATIENT)
Dept: OUTPATIENT SERVICES | Facility: HOSPITAL | Age: 31
LOS: 1 days | End: 2021-11-29
Payer: COMMERCIAL

## 2021-11-29 DIAGNOSIS — R92.8 OTHER ABNORMAL AND INCONCLUSIVE FINDINGS ON DIAGNOSTIC IMAGING OF BREAST: ICD-10-CM

## 2021-11-29 PROCEDURE — 77065 DX MAMMO INCL CAD UNI: CPT | Mod: 26,LT

## 2021-11-29 PROCEDURE — 19085 BX BREAST 1ST LESION MR IMAG: CPT | Mod: LT

## 2021-11-29 PROCEDURE — 88305 TISSUE EXAM BY PATHOLOGIST: CPT | Mod: 26

## 2021-11-29 PROCEDURE — 88305 TISSUE EXAM BY PATHOLOGIST: CPT

## 2021-11-29 PROCEDURE — 88342 IMHCHEM/IMCYTCHM 1ST ANTB: CPT | Mod: 26

## 2021-11-29 PROCEDURE — 88341 IMHCHEM/IMCYTCHM EA ADD ANTB: CPT | Mod: 26

## 2021-11-29 PROCEDURE — A9585: CPT

## 2021-11-29 PROCEDURE — 77065 DX MAMMO INCL CAD UNI: CPT

## 2021-11-29 PROCEDURE — 88341 IMHCHEM/IMCYTCHM EA ADD ANTB: CPT

## 2021-11-29 PROCEDURE — 19085 BX BREAST 1ST LESION MR IMAG: CPT

## 2021-11-30 ENCOUNTER — TRANSCRIPTION ENCOUNTER (OUTPATIENT)
Age: 31
End: 2021-11-30

## 2021-12-01 LAB — SURGICAL PATHOLOGY STUDY: SIGNIFICANT CHANGE UP

## 2021-12-02 ENCOUNTER — TRANSCRIPTION ENCOUNTER (OUTPATIENT)
Age: 31
End: 2021-12-02

## 2021-12-07 ENCOUNTER — NON-APPOINTMENT (OUTPATIENT)
Age: 31
End: 2021-12-07

## 2021-12-07 ENCOUNTER — EMERGENCY (EMERGENCY)
Facility: HOSPITAL | Age: 31
LOS: 1 days | Discharge: HOME | End: 2021-12-07
Attending: EMERGENCY MEDICINE
Payer: COMMERCIAL

## 2021-12-07 VITALS
DIASTOLIC BLOOD PRESSURE: 86 MMHG | RESPIRATION RATE: 20 BRPM | HEIGHT: 63 IN | WEIGHT: 119.93 LBS | HEART RATE: 95 BPM | TEMPERATURE: 98 F | OXYGEN SATURATION: 96 % | SYSTOLIC BLOOD PRESSURE: 119 MMHG

## 2021-12-07 PROCEDURE — 99283 EMERGENCY DEPT VISIT LOW MDM: CPT

## 2021-12-07 RX ORDER — CEPHALEXIN 500 MG
1 CAPSULE ORAL
Qty: 40 | Refills: 0
Start: 2021-12-07 | End: 2021-12-16

## 2021-12-07 NOTE — ED STATDOCS - OBJECTIVE STATEMENT
30 Y/O female w/ PMHx. of cellulitis and Hashimoto's disease on Levothyroxine presents to ED s/p breast biopsy last week after MRI significant for left breast mass now c/o radiating pain from breast to armpit. PT denies fever, n/v/d, allergies to meds. PT states during biopsy a titanium marker was put in place and she is unsure if she is having a reaction to it. PT states she was instructed to come to ED as per PT's OB/GYN office for imaging. 32 Y/O female w/ PMHx. of cellulitis and Hashimoto's disease on Levothyroxine presents to ED s/p breast biopsy last week after MRI significant for left breast mass now c/o radiating pain from breast to armpit. PT denies fever, n/v/d, allergies to meds. PT states during biopsy a titanium marker was put in place and she is unsure if she is having a reaction to it. PT states she was instructed to come to ED as per PT's OB/GYN office for evaluation and possible imaging.

## 2021-12-07 NOTE — ED STATDOCS - NSFOLLOWUPINSTRUCTIONS_ED_ALL_ED_FT
1) Please follow-up with your doctor in the next 5-7 days.  Please call tomorrow for an appointment.  If you cannot follow-up with your doctor please return to the ED for any urgent issues.  2) If you have any worsening of symptoms or any other concerns please return to the ED immediately.  3) Please taking your medications as directed.

## 2021-12-07 NOTE — ED STATDOCS - PATIENT PORTAL LINK FT
You can access the FollowMyHealth Patient Portal offered by [facility] by registering at the following website: [email or web address]. By joining [facility]’s FollowMyHealth portal, you will also be able to view your health information using other applications (apps) compatible with our system.

## 2021-12-07 NOTE — ED STATDOCS - CLINICAL SUMMARY MEDICAL DECISION MAKING FREE TEXT BOX
PT s/p biopsy for mass found during MRI now with left breast pain. No obvious signs of cellulitis and no signs of abscess, however PT has PMHx. of cellulitis. Given recent procedure, will send Keflex to pharmacy. Follow up with doctors.

## 2021-12-07 NOTE — ED STATDOCS - NS ED ROS FT
Review of Systems  •	CONSTITUTIONAL - no  fever, no diaphoresis, no weight change  •	SKIN - no rash  •	HEMATOLOGIC - no bleeding, no bruising  •	EYES - no eye pain, no blurred vision  •	ENT - no change in hearing, no pain  •	RESPIRATORY - no shortness of breath, no cough  •	CARDIAC - no chest pain, no palpitations  •	GI - no abd pain, no nausea, no vomiting, no diarrhea, no constipation, no bleeding  •	GENITO-URINARY - no discharge, no dysuria; no hematuria,   •	ENDO - no polydipsia, no polyuria, no heat/no cold intolerance  •	MUSCULOSKELETAL -  no swelling, no redness, (+) left breast pain  •	NEUROLOGIC - no weakness, no headache, no anesthesia, no paresthesias  •	PSYCH - no anxiety, non suicidal, non homicidal, no hallucination, no depression

## 2021-12-07 NOTE — ED ADULT TRIAGE NOTE - CHIEF COMPLAINT QUOTE
Patient arrived to ED today with c/o left breast swelling, redness, pain, discomfort.  Patient had 8 days ago breast biopsy.

## 2021-12-07 NOTE — ED STATDOCS - NSICDXPASTMEDICALHX_GEN_ALL_CORE_FT
PAST MEDICAL HISTORY:  Diet controlled gestational diabetes mellitus (GDM) in third trimester     Hypothyroidism affecting pregnancy in third trimester     Termination of pregnancy (fetus) x2    Vaginal delivery 2019

## 2021-12-08 ENCOUNTER — NON-APPOINTMENT (OUTPATIENT)
Age: 31
End: 2021-12-08

## 2021-12-09 ENCOUNTER — APPOINTMENT (OUTPATIENT)
Dept: BREAST CENTER | Facility: CLINIC | Age: 31
End: 2021-12-09
Payer: COMMERCIAL

## 2021-12-09 VITALS
HEIGHT: 63 IN | SYSTOLIC BLOOD PRESSURE: 113 MMHG | WEIGHT: 120 LBS | BODY MASS INDEX: 21.26 KG/M2 | HEART RATE: 86 BPM | DIASTOLIC BLOOD PRESSURE: 72 MMHG

## 2021-12-09 DIAGNOSIS — Z80.52 FAMILY HISTORY OF MALIGNANT NEOPLASM OF BLADDER: ICD-10-CM

## 2021-12-09 DIAGNOSIS — Z72.3 LACK OF PHYSICAL EXERCISE: ICD-10-CM

## 2021-12-09 DIAGNOSIS — Z80.42 FAMILY HISTORY OF MALIGNANT NEOPLASM OF PROSTATE: ICD-10-CM

## 2021-12-09 DIAGNOSIS — Z80.3 FAMILY HISTORY OF MALIGNANT NEOPLASM OF BREAST: ICD-10-CM

## 2021-12-09 DIAGNOSIS — Z13.79 ENCOUNTER FOR OTHER SCREENING FOR GENETIC AND CHROMOSOMAL ANOMALIES: ICD-10-CM

## 2021-12-09 DIAGNOSIS — N64.4 MASTODYNIA: ICD-10-CM

## 2021-12-09 DIAGNOSIS — Z80.1 FAMILY HISTORY OF MALIGNANT NEOPLASM OF TRACHEA, BRONCHUS AND LUNG: ICD-10-CM

## 2021-12-09 DIAGNOSIS — Z78.9 OTHER SPECIFIED HEALTH STATUS: ICD-10-CM

## 2021-12-09 PROCEDURE — 99204 OFFICE O/P NEW MOD 45 MIN: CPT

## 2021-12-10 ENCOUNTER — NON-APPOINTMENT (OUTPATIENT)
Age: 31
End: 2021-12-10

## 2021-12-10 PROBLEM — Z78.9 CONSUMES ALCOHOL WEEKLY: Status: ACTIVE | Noted: 2021-12-09

## 2021-12-10 PROBLEM — Z13.79 GENETIC TESTING: Status: ACTIVE | Noted: 2021-12-10

## 2021-12-10 PROBLEM — N64.4 BREAST PAIN, LEFT: Status: ACTIVE | Noted: 2021-12-10

## 2021-12-10 PROBLEM — Z72.3 DOES NOT EXERCISE: Status: ACTIVE | Noted: 2021-12-09

## 2021-12-20 ENCOUNTER — NON-APPOINTMENT (OUTPATIENT)
Age: 31
End: 2021-12-20

## 2021-12-21 ENCOUNTER — NON-APPOINTMENT (OUTPATIENT)
Age: 31
End: 2021-12-21

## 2021-12-21 PROBLEM — Z00.00 ENCOUNTER FOR PREVENTIVE HEALTH EXAMINATION: Status: ACTIVE | Noted: 2021-12-21

## 2022-01-04 ENCOUNTER — TRANSCRIPTION ENCOUNTER (OUTPATIENT)
Age: 32
End: 2022-01-04

## 2022-01-10 ENCOUNTER — TRANSCRIPTION ENCOUNTER (OUTPATIENT)
Age: 32
End: 2022-01-10

## 2022-01-11 ENCOUNTER — RESULT REVIEW (OUTPATIENT)
Age: 32
End: 2022-01-11

## 2022-01-11 ENCOUNTER — INPATIENT (INPATIENT)
Facility: HOSPITAL | Age: 32
LOS: 0 days | Discharge: HOME | DRG: 343 | End: 2022-01-12
Attending: SURGERY | Admitting: SURGERY
Payer: COMMERCIAL

## 2022-01-11 VITALS
DIASTOLIC BLOOD PRESSURE: 80 MMHG | TEMPERATURE: 98 F | HEART RATE: 84 BPM | HEIGHT: 63 IN | WEIGHT: 117.95 LBS | SYSTOLIC BLOOD PRESSURE: 134 MMHG | RESPIRATION RATE: 16 BRPM | OXYGEN SATURATION: 99 %

## 2022-01-11 DIAGNOSIS — K35.80 UNSPECIFIED ACUTE APPENDICITIS: ICD-10-CM

## 2022-01-11 LAB
ALBUMIN SERPL ELPH-MCNC: 4.9 G/DL — SIGNIFICANT CHANGE UP (ref 3.3–5.2)
ALP SERPL-CCNC: 75 U/L — SIGNIFICANT CHANGE UP (ref 40–120)
ALT FLD-CCNC: 14 U/L — SIGNIFICANT CHANGE UP
ANION GAP SERPL CALC-SCNC: 16 MMOL/L — SIGNIFICANT CHANGE UP (ref 5–17)
APTT BLD: 30.4 SEC — SIGNIFICANT CHANGE UP (ref 27.5–35.5)
AST SERPL-CCNC: 17 U/L — SIGNIFICANT CHANGE UP
BASOPHILS # BLD AUTO: 0.07 K/UL — SIGNIFICANT CHANGE UP (ref 0–0.2)
BASOPHILS NFR BLD AUTO: 0.3 % — SIGNIFICANT CHANGE UP (ref 0–2)
BILIRUB SERPL-MCNC: 0.5 MG/DL — SIGNIFICANT CHANGE UP (ref 0.4–2)
BUN SERPL-MCNC: 12.3 MG/DL — SIGNIFICANT CHANGE UP (ref 8–20)
CALCIUM SERPL-MCNC: 9.7 MG/DL — SIGNIFICANT CHANGE UP (ref 8.6–10.2)
CHLORIDE SERPL-SCNC: 101 MMOL/L — SIGNIFICANT CHANGE UP (ref 98–107)
CO2 SERPL-SCNC: 21 MMOL/L — LOW (ref 22–29)
CREAT SERPL-MCNC: 0.65 MG/DL — SIGNIFICANT CHANGE UP (ref 0.5–1.3)
EOSINOPHIL # BLD AUTO: 0.03 K/UL — SIGNIFICANT CHANGE UP (ref 0–0.5)
EOSINOPHIL NFR BLD AUTO: 0.1 % — SIGNIFICANT CHANGE UP (ref 0–6)
GLUCOSE SERPL-MCNC: 124 MG/DL — HIGH (ref 70–99)
HCG SERPL-ACNC: <4 MIU/ML — SIGNIFICANT CHANGE UP
HCT VFR BLD CALC: 40.2 % — SIGNIFICANT CHANGE UP (ref 34.5–45)
HGB BLD-MCNC: 13.7 G/DL — SIGNIFICANT CHANGE UP (ref 11.5–15.5)
IMM GRANULOCYTES NFR BLD AUTO: 0.3 % — SIGNIFICANT CHANGE UP (ref 0–1.5)
LIDOCAIN IGE QN: 29 U/L — SIGNIFICANT CHANGE UP (ref 22–51)
LYMPHOCYTES # BLD AUTO: 1.7 K/UL — SIGNIFICANT CHANGE UP (ref 1–3.3)
LYMPHOCYTES # BLD AUTO: 8.3 % — LOW (ref 13–44)
MAGNESIUM SERPL-MCNC: 1.8 MG/DL — SIGNIFICANT CHANGE UP (ref 1.6–2.6)
MCHC RBC-ENTMCNC: 31 PG — SIGNIFICANT CHANGE UP (ref 27–34)
MCHC RBC-ENTMCNC: 34.1 GM/DL — SIGNIFICANT CHANGE UP (ref 32–36)
MCV RBC AUTO: 91 FL — SIGNIFICANT CHANGE UP (ref 80–100)
MONOCYTES # BLD AUTO: 0.97 K/UL — HIGH (ref 0–0.9)
MONOCYTES NFR BLD AUTO: 4.7 % — SIGNIFICANT CHANGE UP (ref 2–14)
NEUTROPHILS # BLD AUTO: 17.76 K/UL — HIGH (ref 1.8–7.4)
NEUTROPHILS NFR BLD AUTO: 86.3 % — HIGH (ref 43–77)
PLATELET # BLD AUTO: 272 K/UL — SIGNIFICANT CHANGE UP (ref 150–400)
POTASSIUM SERPL-MCNC: 3.8 MMOL/L — SIGNIFICANT CHANGE UP (ref 3.5–5.3)
POTASSIUM SERPL-SCNC: 3.8 MMOL/L — SIGNIFICANT CHANGE UP (ref 3.5–5.3)
PROT SERPL-MCNC: 8.3 G/DL — SIGNIFICANT CHANGE UP (ref 6.6–8.7)
RAPID RVP RESULT: SIGNIFICANT CHANGE UP
RBC # BLD: 4.42 M/UL — SIGNIFICANT CHANGE UP (ref 3.8–5.2)
RBC # FLD: 11.9 % — SIGNIFICANT CHANGE UP (ref 10.3–14.5)
SARS-COV-2 RNA SPEC QL NAA+PROBE: SIGNIFICANT CHANGE UP
SODIUM SERPL-SCNC: 137 MMOL/L — SIGNIFICANT CHANGE UP (ref 135–145)
WBC # BLD: 20.59 K/UL — HIGH (ref 3.8–10.5)
WBC # FLD AUTO: 20.59 K/UL — HIGH (ref 3.8–10.5)

## 2022-01-11 PROCEDURE — 99221 1ST HOSP IP/OBS SF/LOW 40: CPT | Mod: 57

## 2022-01-11 PROCEDURE — 44970 LAPAROSCOPY APPENDECTOMY: CPT | Mod: GC

## 2022-01-11 PROCEDURE — 74177 CT ABD & PELVIS W/CONTRAST: CPT | Mod: 26,MA

## 2022-01-11 PROCEDURE — 88304 TISSUE EXAM BY PATHOLOGIST: CPT | Mod: 26

## 2022-01-11 PROCEDURE — 99285 EMERGENCY DEPT VISIT HI MDM: CPT

## 2022-01-11 DEVICE — STAPLER COVIDIEN TRI-STAPLE 45MM TAN RELOAD: Type: IMPLANTABLE DEVICE | Status: FUNCTIONAL

## 2022-01-11 RX ORDER — SODIUM CHLORIDE 9 MG/ML
1000 INJECTION INTRAMUSCULAR; INTRAVENOUS; SUBCUTANEOUS
Refills: 0 | Status: DISCONTINUED | OUTPATIENT
Start: 2022-01-11 | End: 2022-01-11

## 2022-01-11 RX ORDER — CEFOTETAN DISODIUM 1 G
2 VIAL (EA) INJECTION ONCE
Refills: 0 | Status: DISCONTINUED | OUTPATIENT
Start: 2022-01-11 | End: 2022-01-12

## 2022-01-11 RX ORDER — DIPHENHYDRAMINE HCL 50 MG
50 CAPSULE ORAL ONCE
Refills: 0 | Status: COMPLETED | OUTPATIENT
Start: 2022-01-11 | End: 2022-01-11

## 2022-01-11 RX ORDER — CEFOTETAN DISODIUM 1 G
1 VIAL (EA) INJECTION EVERY 12 HOURS
Refills: 0 | Status: DISCONTINUED | OUTPATIENT
Start: 2022-01-11 | End: 2022-01-11

## 2022-01-11 RX ORDER — SODIUM CHLORIDE 9 MG/ML
1000 INJECTION, SOLUTION INTRAVENOUS
Refills: 0 | Status: DISCONTINUED | OUTPATIENT
Start: 2022-01-11 | End: 2022-01-11

## 2022-01-11 RX ORDER — MORPHINE SULFATE 50 MG/1
2 CAPSULE, EXTENDED RELEASE ORAL ONCE
Refills: 0 | Status: DISCONTINUED | OUTPATIENT
Start: 2022-01-11 | End: 2022-01-11

## 2022-01-11 RX ORDER — SODIUM CHLORIDE 9 MG/ML
1000 INJECTION INTRAMUSCULAR; INTRAVENOUS; SUBCUTANEOUS ONCE
Refills: 0 | Status: COMPLETED | OUTPATIENT
Start: 2022-01-11 | End: 2022-01-11

## 2022-01-11 RX ORDER — FAMOTIDINE 10 MG/ML
20 INJECTION INTRAVENOUS ONCE
Refills: 0 | Status: COMPLETED | OUTPATIENT
Start: 2022-01-11 | End: 2022-01-11

## 2022-01-11 RX ORDER — ACETAMINOPHEN 500 MG
975 TABLET ORAL EVERY 6 HOURS
Refills: 0 | Status: DISCONTINUED | OUTPATIENT
Start: 2022-01-11 | End: 2022-01-12

## 2022-01-11 RX ORDER — ONDANSETRON 8 MG/1
4 TABLET, FILM COATED ORAL ONCE
Refills: 0 | Status: COMPLETED | OUTPATIENT
Start: 2022-01-11 | End: 2022-01-11

## 2022-01-11 RX ORDER — HYDROMORPHONE HYDROCHLORIDE 2 MG/ML
0.5 INJECTION INTRAMUSCULAR; INTRAVENOUS; SUBCUTANEOUS
Refills: 0 | Status: DISCONTINUED | OUTPATIENT
Start: 2022-01-11 | End: 2022-01-11

## 2022-01-11 RX ADMIN — ONDANSETRON 4 MILLIGRAM(S): 8 TABLET, FILM COATED ORAL at 08:13

## 2022-01-11 RX ADMIN — Medication 125 MILLIGRAM(S): at 08:19

## 2022-01-11 RX ADMIN — SODIUM CHLORIDE 1000 MILLILITER(S): 9 INJECTION INTRAMUSCULAR; INTRAVENOUS; SUBCUTANEOUS at 08:13

## 2022-01-11 RX ADMIN — MORPHINE SULFATE 2 MILLIGRAM(S): 50 CAPSULE, EXTENDED RELEASE ORAL at 08:18

## 2022-01-11 RX ADMIN — HYDROMORPHONE HYDROCHLORIDE 0.5 MILLIGRAM(S): 2 INJECTION INTRAMUSCULAR; INTRAVENOUS; SUBCUTANEOUS at 16:45

## 2022-01-11 RX ADMIN — FAMOTIDINE 20 MILLIGRAM(S): 10 INJECTION INTRAVENOUS at 08:13

## 2022-01-11 RX ADMIN — HYDROMORPHONE HYDROCHLORIDE 0.5 MILLIGRAM(S): 2 INJECTION INTRAMUSCULAR; INTRAVENOUS; SUBCUTANEOUS at 17:17

## 2022-01-11 RX ADMIN — Medication 50 MILLIGRAM(S): at 13:14

## 2022-01-11 RX ADMIN — Medication 975 MILLIGRAM(S): at 21:55

## 2022-01-11 RX ADMIN — HYDROMORPHONE HYDROCHLORIDE 0.5 MILLIGRAM(S): 2 INJECTION INTRAMUSCULAR; INTRAVENOUS; SUBCUTANEOUS at 16:30

## 2022-01-11 RX ADMIN — HYDROMORPHONE HYDROCHLORIDE 0.5 MILLIGRAM(S): 2 INJECTION INTRAMUSCULAR; INTRAVENOUS; SUBCUTANEOUS at 16:40

## 2022-01-11 RX ADMIN — Medication 975 MILLIGRAM(S): at 22:55

## 2022-01-11 NOTE — ED ADULT TRIAGE NOTE - CHIEF COMPLAINT QUOTE
Ambulatory s/p waking up at 3a with terrible umbilical abdominal pain that radiates down to R groin with associated nausea but no vomiting. Patient denies history of kidney stones, urinary symptoms. Unvaccinated.

## 2022-01-11 NOTE — ASU DISCHARGE PLAN (ADULT/PEDIATRIC) - NS MD DC FALL RISK RISK
For information on Fall & Injury Prevention, visit: [email or web address] OR  [email or web address] OR  https://www.cdc.gov/steadi/patient.html

## 2022-01-11 NOTE — ED STATDOCS - OBJECTIVE STATEMENT
31y female pt with pmhx hypothyroid, pregestational diabetes presents to ED c/o worsening severe abd pain periumbilical radiating to epigastric region and groin area x4 hours. States she woke up from her sleep from the pain at 3am. Pt states she is extremely nauseous but has not vomited. States she has been dry heaving. Pt states her last known bowel movement was yesterday or day before.   pt denies alcohol use, cigarette use, pain with urination, diarrhea

## 2022-01-11 NOTE — BRIEF OPERATIVE NOTE - OPERATION/FINDINGS
Abdomen entered with Elizalde technique, insufflated. Minimal blood in pelvis noted. Appendix identified and isolated. Stapled across at base of appendix. Appendiceal artery dissected with ligasure. Abdomen irrigated. Abdomen entered with Elizalde technique, insufflated. Minimal blood in pelvis noted. Appendix identified and isolated. Stapled across at base of appendix. Appendiceal artery transected with LigaSure. Abdomen irrigated.

## 2022-01-11 NOTE — ASU DISCHARGE PLAN (ADULT/PEDIATRIC) - CONDITION AT DISCHARGE
Subsequent Inpatient Encounter                                       Detox Unit    ARTHUR CASTILLO   32y   Male      Chief Complaint:    Follow up for Polysubstance  Dependency    HPI:     I reviewed previous notes. No Change, except if noted below.             Detail:_    ROS:   I reviewed with patient.  No changes from previous notes except if noted below.             Detail: _    PFSH I reviewed with patient. No changes from previous notes except if noted below.             Detail_    Medication reconciliation performed.    MEDICATIONS  (STANDING):  escitalopram 20 milliGRAM(s) Oral daily  folic acid 1 milliGRAM(s) Oral daily  methadone   Dispersible 90 milliGRAM(s) Oral daily  multivitamin 1 Tablet(s) Oral daily  PHENobarbital   Oral   PHENobarbital 32.4 milliGRAM(s) Oral every 6 hours  thiamine 100 milliGRAM(s) Oral daily      MEDICATIONS  (PRN):  aluminum hydroxide/magnesium hydroxide/simethicone Suspension 30 milliLiter(s) Oral every 4 hours PRN Dyspepsia  cloNIDine 0.1 milliGRAM(s) Oral every 8 hours PRN sbp>140  hydrOXYzine hydrochloride 50 milliGRAM(s) Oral every 6 hours PRN Anxiety  hydrOXYzine hydrochloride 100 milliGRAM(s) Oral at bedtime PRN insomnia  magnesium hydroxide Suspension 30 milliLiter(s) Oral daily PRN Constipation  PHENobarbital 32.4 milliGRAM(s) Oral every 4 hours PRN Withdrawal  vitamin A &amp; D Ointment 1 Application(s) Topical four times a day PRN dry skin      T(C): 35.7 (18 @ 06:00), Max: 36.9 (18 @ 18:00)  HR: 60 (18 @ 06:00) (53 - 65)  BP: 139/55 (18 @ 06:00) (118/69 - 139/55)  RR: 14 (18 @ 06:00) (14 - 16)  SpO2: --    PHYSICAL EXAM:      Constitutional: NAD, A&O x3    Eyes: PERRLA, no conjuctivitis    Neck: no lymphadenopathy    Respiratory: +air entry, no rales, no rhonchi, no wheezes    Cardiovascular: +S1 and S2, regular rate and rhythm    Gastrointestinal: +BS, soft, non-tender, not distended    Extremities:  no edema, no calf tenderness    Skin: no rashes, normal turgor            Magnesium, Serum: 2.4 mg/dL (18 @ 05:07)  Ammonia, Serum: 22 umol/L (18 @ 05:07)  Treponema Pallidum Antibody Interpretation: Negative (18 @ 05:07)  Hepatitis B Surface Antigen: Nonreact (18 @ 05:07)  Hepatitis C Virus S/CO Ratio: 14.38 S/CO (18 @ 05:07)    Hepatitis C Virus Interpretation: Reactive (18 @ 05:07)      Urinalysis Basic - ( 07 May 2018 10:55 )    Color: Yellow / Appearance: Clear / S.020 / pH: x  Gluc: x / Ketone: Negative  / Bili: Negative / Urobili: 0.2 mg/dL   Blood: x / Protein: Trace mg/dL / Nitrite: Negative   Leuk Esterase: Negative / RBC: x / WBC x   Sq Epi: x / Non Sq Epi: Few /HPF / Bacteria: Moderate    Drug Screen 1, Urine Result: Done (18 @ 10:55)        Impression and Plan:    Primary Diagnosis:  Benzo/Opiate Dependency                                Medication: Pheno/Methadone Protocol    Secondary Diagnosis:                                                                                Medication:    Tertiary Diagnosis:                                                                                     Medication:      Continue Detox Protocols. Use of PRNS as needed for withdrawal and comfort.    Adjustments to protocols:    Labs/ Tests reviewed.    Tests ordered:     Likely Disposition: _X__Home       ___Rehab       ___Outpatient Program    ___Self Help     _____Other    Estimated Length of stay:__4__ Stable

## 2022-01-11 NOTE — ASU DISCHARGE PLAN (ADULT/PEDIATRIC) - CARE PROVIDER_API CALL
Bartolo Mcallister (MD)  Surgery; Surgical Critical Care  [address] [facility], [address]  [place] [address]  Phone: [phone]  Fax: [phone]  Follow Up Time: 2 weeks

## 2022-01-11 NOTE — ED STATDOCS - PHYSICAL EXAMINATION
Constitutional: Awake, Alert. NAD. Well appearing, well nourished. Cooperative. VSS.  HEAD: NC/AT; no signs of trauma   EYES: Conjunctiva and sclera are clear bilaterally.   ENT: No rhinorrhea, normal pharynx, oropharynx patent, no tonsillar exudates or enlargement, MMM, no erythema, no drooling or stridor.   NECK: FROM.  CARDIOVASCULAR: RRR, Normal S1, S2. No audible murmurs. No chest wall ttp. Radial pulses +2 B/L.  RESPIRATORY: Speaking full sentences. Normal respiratory effort; breath sounds CTAB, No WRR. No accessory muscle use.   ABDOMEN: (+)TTP generalized mostly periumbilical, epigastric, rlq, Soft; ND. No CVAT B/L. +BS x4 quadrants.  EXTREMITIES: Full active ROM in all extremities; non-tender to palpation; distal pulses palpable and symmetric, no edema, no crepitus or step off.  SKIN: Warm, dry; good skin turgor, no apparent lesions or rashes, no ecchymosis, brisk capillary refill.  NEURO: AAOx3 follows commands. No facial droop. CN 2-12 grossly intact. No truncal ataxia. Steady gait. Sensation intact B/L. Moving all extremities spontaneously. Constitutional: Awake, Alert. NAD. Well appearing, well nourished. Cooperative. VSS.  HEAD: NC/AT; no signs of trauma   EYES: Conjunctiva and sclera are clear bilaterally.   ENT: No rhinorrhea, normal pharynx, oropharynx patent, no tonsillar exudates or enlargement, MMM, no erythema, no drooling or stridor.   NECK: FROM.  CARDIOVASCULAR: RRR, Normal S1, S2. No audible murmurs. No chest wall ttp. Radial pulses +2 B/L.  RESPIRATORY: Speaking full sentences. Normal respiratory effort; breath sounds CTAB, No WRR. No accessory muscle use.   ABDOMEN: (+)TTP generalized mostly supraumb, epigastric, rlq, Soft.  EXTREMITIES: Full active ROM in all extremities; non-tender to palpation; distal pulses palpable and symmetric, no edema, no crepitus or step off.  SKIN: Warm, dry; good skin turgor, no apparent lesions or rashes, no ecchymosis, brisk capillary refill.  NEURO: AAOx3 follows commands. No facial droop. CN 2-12 grossly intact. No truncal ataxia. Steady gait. Sensation intact B/L. Moving all extremities spontaneously. Constitutional: Awake, Alert. NAD. Well appearing, well nourished. Cooperative. VSS.  HEAD: NC/AT; no signs of trauma   EYES: Conjunctiva and sclera are clear bilaterally.   ENT: No rhinorrhea, normal pharynx, oropharynx patent, no tonsillar exudates or enlargement, MMM, no erythema, no drooling or stridor.   NECK: FROM.  CARDIOVASCULAR: RRR, Normal S1, S2. No audible murmurs. No chest wall ttp. Radial pulses +2 B/L.  RESPIRATORY: Speaking full sentences. Normal respiratory effort; breath sounds CTAB, No WRR. No accessory muscle use.   ABDOMEN: (+)TTP generalized mostly periumb, epigastric, rlq, Soft.  EXTREMITIES: Full active ROM in all extremities; non-tender to palpation; distal pulses palpable and symmetric, no edema, no crepitus or step off.  SKIN: Warm, dry; good skin turgor, no apparent lesions or rashes, no ecchymosis, brisk capillary refill.  NEURO: AAOx3 follows commands. No facial droop. CN 2-12 grossly intact. No truncal ataxia. Steady gait. Sensation intact B/L. Moving all extremities spontaneously.

## 2022-01-11 NOTE — H&P ADULT - ASSESSMENT
30 yo female who is presenting to the ED with a chief complaint of abdominal pain. In the ED, labs were notable for a WBC of 20.5. CT scan was notable for a dilated appendix measuring 1.3 cm with an appendicolith measuring 4 mm mild infiltration of the periappendiceal fat. There was mild wall thickening/wall edema involving the cecum at the base of the appendix.    - NPO/IVF  - Admit to OR for laparoscopic appendectomy  - IV Abx

## 2022-01-11 NOTE — ASU DISCHARGE PLAN (ADULT/PEDIATRIC) - ASU DC SPECIAL INSTRUCTIONSFT
Please make an appointment to follow-up with Dr. Mcallister within 1-2 weeks of your surgery. Office number is listed below. You may shower starting tomorrow. Avoid rubbing or scrubbing your incision, simply wash with gentle soap and rinse with warm water. Your incisions have thin, white strips called Steri Strips, they will fall off over the course of the next few weeks. Please do not attempt to remove them. Do not submerge wounds for two weeks. Exercise is encouraged but refrain from any heavy lifting (>10 lbs) for the first 6-8 weeks after surgery. You should take Tylenol (daily maximum 4000mg) and/or Motrin every 6 hours for the next 24 hours and then as needed for pain. You can use a warm or cool compress for comfort as needed.

## 2022-01-11 NOTE — H&P ADULT - ATTENDING COMMENTS
seen and examined 01-11-22 @ 1200    soft / moderate RLQ tenderness / ND  no surgical scars on abdomen    WBC = 20    CT abd/pelvis w/ contrast - dilated appendix with appendicolith in mid appendix, fluid-filled proximal to fecalith and air-filled distal to fecalith. no evidence of perforation      acute appendicitis with localized peritonitis  -The risks (bleeding, infection, bowel injury), benefits and alternatives of laparoscopic (possible open) appendectomy were discussed with the patient. Written informed consent was obtained for urgent surgery. seen and examined 01-11-22 @ 1200    soft / moderate RLQ tenderness / ND  transverse supraumbilical laparoscopy scar    WBC = 20    CT abd/pelvis w/ contrast - dilated appendix with appendicolith in mid appendix, fluid-filled proximal to fecalith and air-filled distal to fecalith. no evidence of perforation      acute appendicitis with localized peritonitis  -The risks (bleeding, infection, bowel injury), benefits and alternatives of laparoscopic (possible open) appendectomy were discussed with the patient. Written informed consent was obtained for urgent surgery.

## 2022-01-11 NOTE — ED STATDOCS - CLINICAL SUMMARY MEDICAL DECISION MAKING FREE TEXT BOX
31y female no significant pmhx presents with  generalized abd pain worse periumbilical and rlq x4 hours associated with nausea.   VSS   (+)TTP to epigastric, periumbilical, rlq   Consider appendicitis, pancreatitis, cholecystitis, UTI, constipation/SBO  will get labs CT reassess. 31y female no significant pmhx presents with  generalized abd pain worse epigastric, supraumb and rlq x4 hours associated with nausea.   VSS   (+)TTP to epigastric, periumbilical, rlq   Consider appendicitis, pancreatitis, cholecystitis, ovarian cyst rupture, UTI, constipation/SBO  will get labs CT reassess. 31y female no significant pmhx presents with  generalized abd pain worse epigastric, supraumb and rlq x4 hours associated with nausea.   VSS   (+)TTP to epigastric, periumbilical, rlq     Consider appendicitis, pancreatitis, cholecystitis, ovarian cyst rupture, UTI, constipation/SBO  will get labs CT reassess.

## 2022-01-11 NOTE — CHART NOTE - NSCHARTNOTEFT_GEN_A_CORE
POST-OP NOTE    ADALID HDZ | [id number] | [facility] [id number]    Procedure: s/p laparoscopic appendectomy    Subjective: Patient was seen and examined at bedside. States pain is well controlled. Denies N/V, CP, SOB after surgery. Tolerating PO without issue. Has been out of bed and voided.    Vital Signs Last 24 Hrs  T(C): 36.7 (11 Jan 2022 18:00), Max: 36.7 (11 Jan 2022 16:00)  T(F): 98 (11 Jan 2022 18:00), Max: 98 (11 Jan 2022 16:00)  HR: 90 (11 Jan 2022 18:00) (84 - 110)  BP: 105/66 (11 Jan 2022 18:00) (102/60 - 134/80)  BP(mean): --  RR: 18 (11 Jan 2022 18:00) (14 - 19)  SpO2: 96% (11 Jan 2022 18:00) (96% - 100%)  I&O's Summary    11 Jan 2022 07:01  -  11 Jan 2022 22:38  --------------------------------------------------------  IN: 390 mL / OUT: 130 mL / NET: 260 mL                            13.7   20.59 )-----------( 272      ( 11 Jan 2022 08:12 )             40.2     01-11    137  |  101  |  12.3  ----------------------------<  124<H>  3.8   |  21.0<L>  |  0.65    Ca    9.7      11 Jan 2022 08:12  Mg     1.8     01-11    TPro  8.3  /  Alb  4.9  /  TBili  0.5  /  DBili  x   /  AST  17  /  ALT  14  /  AlkPhos  75  01-11   PTT - ( 11 Jan 2022 21:27 )  PTT:30.4 sec    PHYSICAL EXAM:  Gen: NAD  Resp: breathing easily, no stridor  CV: RRR  Abdomen: soft, nondistended, appropriately tender to palpation  Skin: Incision with dried blood on steri strips    A/P: 31F s/p lap appendectomy. recovering well on floor    - Reg diet  - Pain control PRN  - Anticipate DC in AM

## 2022-01-11 NOTE — H&P ADULT - HISTORY OF PRESENT ILLNESS
ACUTE CARE SURGERY CONSULT    Time Consult Called: 11:30 AM  Time Consult Seen: 12:00 PM    HPI:  32 yo female who is presenting to the ED with a chief complaint of abdominal pain. She states that it started last night at 3 AM, was initially periumbilical and migrated to the RLQ, and notes associated nausea and vomiting. Her past medical history is notable for children (spontaneous vaginal delivery x 2), recent breast biopsy for a benign finding identified on mammography, and a 1 week hospitalization for RLE cellulitis treated with IV antibiotics when she was 9 years old.    In the ED, labs were notable for a WBC of 20.5. CT scan was notable for a dilated appendix measuring 1.3 cm with an appendicolith measuring 4 mm mild infiltration of the periappendiceal fat. There was mild wall thickening/wall edema involving the cecum at the base of the appendix.    PAST MEDICAL HISTORY:  No pertinent past medical history    Termination of pregnancy (fetus)    Vaginal delivery    Hypothyroidism affecting pregnancy in third trimester    Diet controlled gestational diabetes mellitus (GDM) in third trimester        PAST SURGICAL HISTORY:  No significant past surgical history        ALLERGIES:  bee pollen (Anaphylaxis)  bee-sting (Anaphylaxis; Hives)  shellfish (Anaphylaxis)      FAMILY HISTORY: Noncontributory    SOCIAL HISTORY: Denies tobacco, EtOH, illicit substance use.     HOME MEDICATIONS:    MEDICATIONS  (STANDING):  cefoTEtan  IVPB 2 Gram(s) IV Intermittent Once  cefoTEtan  IVPB 1 Gram(s) IV Intermittent every 12 hours    MEDICATIONS  (PRN):      VITALS & I/Os:  Vital Signs Last 24 Hrs  T(C): 36.4 (11 Jan 2022 06:55), Max: 36.4 (11 Jan 2022 06:55)  T(F): 97.5 (11 Jan 2022 06:55), Max: 97.5 (11 Jan 2022 06:55)  HR: 84 (11 Jan 2022 06:55) (84 - 84)  BP: 134/80 (11 Jan 2022 06:55) (134/80 - 134/80)  BP(mean): --  RR: 16 (11 Jan 2022 06:55) (16 - 16)  SpO2: 99% (11 Jan 2022 06:55) (99% - 99%)  CAPILLARY BLOOD GLUCOSE          I&O's Summary      GENERAL: Alert, well developed, in no acute distress.  MENTAL STATUS: AAOx3. Appropriate affect.  HEENT: PERRLA. EOMI. MMM.  Trachea midline. No lymph node swelling or tenderness.  RESPIRATORY: CTAB. No wheezing, rales or rhonchi.  CARDIOVASCULAR: RRR. No audible murmurs, rubs or gallops.   GASTROINTESTINAL: Abdomen soft, tender to palpation in right lower quadrant  NEUROLOGIC: Cranial nerves II-XII grossly intact. No focal neurological deficits. Moves all extremities spontaneously. Sensation intact bilaterally.  INTEGUMENTARY: No overt rashes or lesions, petechia or purpura. Good turgor. No edema.  MUSCULOSKELETAL: No cyanosis or clubbing. No gross deformities.   LYMPHATIC: Palpation of neck reveals no swelling or tenderness of neck nodes. Palpation of groin reveals no swelling or tenderness of groin nodes.    LABS:                        13.7   20.59 )-----------( 272      ( 11 Jan 2022 08:12 )             40.2     01-11    137  |  101  |  12.3  ----------------------------<  124<H>  3.8   |  21.0<L>  |  0.65    Ca    9.7      11 Jan 2022 08:12  Mg     1.8     01-11    TPro  8.3  /  Alb  4.9  /  TBili  0.5  /  DBili  x   /  AST  17  /  ALT  14  /  AlkPhos  75  01-11    Lactate: cefoTEtan  IVPB 2 Gram(s) IV Intermittent Once  cefoTEtan  IVPB 1 Gram(s) IV Intermittent every 12 hours     IMAGING:      ACC: [id number] EXAM:  CT ABDOMEN AND PELVIS IC                          PROCEDURE DATE:  01/11/2022          INTERPRETATION:  CLINICAL INFORMATION: Right lower quadrant and   periumbilical pain.    COMPARISON: None.    CONTRAST/COMPLICATIONS:  IV Contrast: Omnipaque 300  95 cc administered   5 cc discarded  Oral Contrast: NONE  Complications: None reported at time of study completion    PROCEDURE:  CT of the Abdomen and Pelvis was performed.  Sagittal and coronal reformats were performed.    FINDINGS:  LOWER CHEST: Lung bases are clear.    LIVER: Within normal limits.  BILE DUCTS: Normal caliber.  GALLBLADDER: Within normal limits.  SPLEEN: Within normal limits.  PANCREAS: Within normal limits.  ADRENALS: Within normal limits.  KIDNEYS/URETERS: Mild fullness of the renal collecting systems, likely   secondary to a distended urinary bladder. 1.1 cm right renal cyst.    BLADDER: Distended with a normal caliber wall.  REPRODUCTIVE ORGANS: Uterus and adnexa are unremarkable.    BOWEL: No bowel obstruction. Dilated appendix measuring 1.3 cm with an   appendicolith measuring 4 mm mild infiltration of the periappendiceal   fat. Mild wall thickening/wall edema involving the cecum at the base of   the appendix. Moderate amount retained fecal material in the colon.  PERITONEUM: No ascites.  VESSELS: Abdominal aorta normal in caliber.  RETROPERITONEUM/LYMPH NODES: No lymphadenopathy.  ABDOMINAL WALL: Small fat-containing umbilical hernia.  BONES: No aggressive osseous lesion.    IMPRESSION:    Appendicitis.    Mild wall thickening/wall edema in the cecum at the base of the appendix.    The findings were discussed with Dr. Roberts on 1/11/2022 11:30 AM    --- End of Report ---            DWIGHT DAVIS MD; Attending Radiologist  This document has been electronically signed. Jan 11 2022 11:32AM

## 2022-01-12 ENCOUNTER — TRANSCRIPTION ENCOUNTER (OUTPATIENT)
Age: 32
End: 2022-01-12

## 2022-01-12 VITALS
RESPIRATION RATE: 18 BRPM | TEMPERATURE: 98 F | OXYGEN SATURATION: 98 % | DIASTOLIC BLOOD PRESSURE: 63 MMHG | SYSTOLIC BLOOD PRESSURE: 102 MMHG | HEART RATE: 86 BPM

## 2022-01-12 PROCEDURE — 99285 EMERGENCY DEPT VISIT HI MDM: CPT | Mod: 25

## 2022-01-12 PROCEDURE — C1889: CPT

## 2022-01-12 PROCEDURE — 74177 CT ABD & PELVIS W/CONTRAST: CPT | Mod: MA

## 2022-01-12 PROCEDURE — 83735 ASSAY OF MAGNESIUM: CPT

## 2022-01-12 PROCEDURE — 36415 COLL VENOUS BLD VENIPUNCTURE: CPT

## 2022-01-12 PROCEDURE — 80053 COMPREHEN METABOLIC PANEL: CPT

## 2022-01-12 PROCEDURE — 88304 TISSUE EXAM BY PATHOLOGIST: CPT

## 2022-01-12 PROCEDURE — 84702 CHORIONIC GONADOTROPIN TEST: CPT

## 2022-01-12 PROCEDURE — 0225U NFCT DS DNA&RNA 21 SARSCOV2: CPT

## 2022-01-12 PROCEDURE — 85730 THROMBOPLASTIN TIME PARTIAL: CPT

## 2022-01-12 PROCEDURE — 96374 THER/PROPH/DIAG INJ IV PUSH: CPT

## 2022-01-12 PROCEDURE — 83690 ASSAY OF LIPASE: CPT

## 2022-01-12 PROCEDURE — 85025 COMPLETE CBC W/AUTO DIFF WBC: CPT

## 2022-01-12 PROCEDURE — 96375 TX/PRO/DX INJ NEW DRUG ADDON: CPT

## 2022-01-12 RX ORDER — IBUPROFEN 200 MG
1 TABLET ORAL
Qty: 30 | Refills: 0
Start: 2022-01-12

## 2022-01-12 RX ORDER — TRAMADOL HYDROCHLORIDE 50 MG/1
1 TABLET ORAL
Qty: 10 | Refills: 0
Start: 2022-01-12

## 2022-01-12 RX ORDER — LANOLIN ALCOHOL/MO/W.PET/CERES
3 CREAM (GRAM) TOPICAL AT BEDTIME
Refills: 0 | Status: DISCONTINUED | OUTPATIENT
Start: 2022-01-12 | End: 2022-01-12

## 2022-01-12 RX ORDER — ACETAMINOPHEN 500 MG
3 TABLET ORAL
Qty: 30 | Refills: 0
Start: 2022-01-12

## 2022-01-12 RX ADMIN — Medication 975 MILLIGRAM(S): at 10:48

## 2022-01-12 RX ADMIN — Medication 975 MILLIGRAM(S): at 11:47

## 2022-01-12 RX ADMIN — Medication 975 MILLIGRAM(S): at 05:16

## 2022-01-12 RX ADMIN — Medication 3 MILLIGRAM(S): at 00:32

## 2022-01-12 RX ADMIN — Medication 975 MILLIGRAM(S): at 04:16

## 2022-01-12 NOTE — DISCHARGE NOTE NURSING/CASE MANAGEMENT/SOCIAL WORK - PATIENT PORTAL LINK FT
You can access the FollowMyHealth Patient Portal offered by [facility] by registering at the following website: [email or web address]. By joining [employer]’s FollowMyHealth portal, you will also be able to view your health information using other applications (apps) compatible with our system.

## 2022-01-12 NOTE — DISCHARGE NOTE PROVIDER - NSDCCPCAREPLAN_GEN_ALL_CORE_FT
PRINCIPAL DISCHARGE DIAGNOSIS  Diagnosis: Acute appendicitis  Assessment and Plan of Treatment: s/p Laparoscopic Appendectomy

## 2022-01-12 NOTE — PROGRESS NOTE ADULT - ASSESSMENT
Assessment: 30yo Female POD1 s/p lap appy. Recovering well. Stayed overnight per patient's request.    Plan:   - Reg diet  - pain control prn  - OOB/ambulate  - Dispo: home today, no skilled needs      Acute Care Surgery

## 2022-01-12 NOTE — DISCHARGE NOTE PROVIDER - NSDCFUADDINST_GEN_ALL_CORE_FT
Please call the number provided to make a followup appointment within the next 2 weeks. Take prescribed medications as instructed. Avoid heavy lifting (anything greater than 15 lbs) during the next 6 weeks. Please seek medical attention if you develop fevers, severe pain not relieved with pain medications, bleeding that does not stop.

## 2022-01-12 NOTE — DISCHARGE NOTE PROVIDER - CARE PROVIDER_API CALL
Bartolo Mcallister (MD)  Surgery; Surgical Critical Care  [address] [facility] [address], [address]  [place] [address]  Phone: [phone]  Fax: [phone]  Established Patient  Follow Up Time: 2 weeks

## 2022-01-12 NOTE — DISCHARGE NOTE PROVIDER - NSDCMRMEDTOKEN_GEN_ALL_CORE_FT
acetaminophen 325 mg oral tablet: 3 tab(s) orally every 6 hours  Alivio 400 mg oral tablet: 1 tab(s) orally every 4 hours   traMADol 50 mg oral tablet: 1 tab(s) orally every 4 hours MDD:max 6 tabs per day

## 2022-01-12 NOTE — DISCHARGE NOTE PROVIDER - HOSPITAL COURSE
HPI:  30 yo female who is presenting to the ED with a chief complaint of abdominal pain. She states that it started last night at 3 AM, was initially periumbilical and migrated to the RLQ, and notes associated nausea and vomiting. Her past medical history is notable for children (spontaneous vaginal delivery x 2), recent breast biopsy for a benign finding identified on mammography, and a 1 week hospitalization for RLE cellulitis treated with IV antibiotics when she was 9 years old.    In the ED, labs were notable for a WBC of 20.5. CT scan was notable for a dilated appendix measuring 1.3 cm with an appendicolith measuring 4 mm mild infiltration of the periappendiceal fat. There was mild wall thickening/wall edema involving the cecum at the base of the appendix.    Patient was admitted and subsequently taken to OR and underwent Laparoscopic Appendectomy. Patient tolerated the procedure without major incidents. Postoperatively, patient complained of some pain but was tolerating diet. Today patient seen to have better pain control, voiding freely, tolerating diet and without nausea nor emesis seen ready for dischagre to home with outpatient followup.

## 2022-01-12 NOTE — DISCHARGE NOTE NURSING/CASE MANAGEMENT/SOCIAL WORK - NSDCPEFALRISK_GEN_ALL_CORE
For information on Fall & Injury Prevention, visit: [email or web address] OR  [email or web address] OR  https://www.cdc.gov/steadi/patient.html

## 2022-01-12 NOTE — PROGRESS NOTE ADULT - SUBJECTIVE AND OBJECTIVE BOX
ACUTE CARE SURGERY PROGRESS NOTE    Subjective: Patient examined at bedside this AM. Reports her pain is adequately controlled. She has been OOB and ambulating. Tolerating PO without N/V. No acute events overnight.    Objective:  Vital Signs  T(C): 36.8 (01-11 @ 23:45), Max: 36.9 (01-11 @ 22:45)  HR: 83 (01-11 @ 23:45) (76 - 110)  BP: 95/55 (01-11 @ 23:45) (95/55 - 134/80)  RR: 18 (01-11 @ 23:45) (14 - 19)  SpO2: 97% (01-11 @ 23:45) (96% - 100%)  01-11-22 @ 07:01  -  01-12-22 @ 02:28  --------------------------------------------------------  IN:  Total IN: 0 mL    OUT:    Voided (mL): 130 mL  Total OUT: 130 mL    Total NET: -130 mL          Physical Exam:  General: alert and oriented, NAD  Resp: airway patent, respirations unlabored  CVS: regular rate and rhythm  Abdomen: soft, nondistended, appropriately tender to palpation, steri strips with dried blood, no erythema or evidence of bleeding  Extremities: no edema  Skin: warm, dry, appropriate color    Labs:                        13.7   20.59 )-----------( 272      ( 11 Jan 2022 08:12 )             40.2   01-11    137  |  101  |  12.3  ----------------------------<  124<H>  3.8   |  21.0<L>  |  0.65    Ca    9.7      11 Jan 2022 08:12  Mg     1.8     01-11    TPro  8.3  /  Alb  4.9  /  TBili  0.5  /  DBili  x   /  AST  17  /  ALT  14  /  AlkPhos  75  01-11    CAPILLARY BLOOD GLUCOSE          Medications:   MEDICATIONS  (STANDING):  acetaminophen     Tablet .. 975 milliGRAM(s) Oral every 6 hours  cefoTEtan  IVPB 2 Gram(s) IV Intermittent Once  melatonin 3 milliGRAM(s) Oral at bedtime    MEDICATIONS  (PRN):

## 2022-01-18 LAB — SURGICAL PATHOLOGY STUDY: SIGNIFICANT CHANGE UP

## 2022-01-25 ENCOUNTER — APPOINTMENT (OUTPATIENT)
Dept: TRAUMA SURGERY | Facility: CLINIC | Age: 32
End: 2022-01-25
Payer: COMMERCIAL

## 2022-01-25 VITALS
DIASTOLIC BLOOD PRESSURE: 78 MMHG | WEIGHT: 114 LBS | HEART RATE: 90 BPM | TEMPERATURE: 97.4 F | SYSTOLIC BLOOD PRESSURE: 123 MMHG | OXYGEN SATURATION: 98 % | HEIGHT: 62.5 IN | BODY MASS INDEX: 20.45 KG/M2 | RESPIRATION RATE: 14 BRPM

## 2022-01-25 PROCEDURE — 99024 POSTOP FOLLOW-UP VISIT: CPT

## 2022-02-08 ENCOUNTER — APPOINTMENT (OUTPATIENT)
Dept: TRAUMA SURGERY | Facility: CLINIC | Age: 32
End: 2022-02-08
Payer: COMMERCIAL

## 2022-02-08 VITALS
BODY MASS INDEX: 21.17 KG/M2 | OXYGEN SATURATION: 98 % | WEIGHT: 118 LBS | DIASTOLIC BLOOD PRESSURE: 69 MMHG | HEIGHT: 62.5 IN | TEMPERATURE: 97.3 F | SYSTOLIC BLOOD PRESSURE: 105 MMHG | RESPIRATION RATE: 16 BRPM | HEART RATE: 88 BPM

## 2022-02-08 DIAGNOSIS — K35.30 ACUTE APPENDICITIS W/ LOCALIZED PERITONITIS, W/O PERFORATION OR GANGRENE: ICD-10-CM

## 2022-02-08 PROCEDURE — 99024 POSTOP FOLLOW-UP VISIT: CPT

## 2022-04-19 ENCOUNTER — LABORATORY RESULT (OUTPATIENT)
Age: 32
End: 2022-04-19

## 2022-04-21 ENCOUNTER — APPOINTMENT (OUTPATIENT)
Dept: ENDOCRINOLOGY | Facility: CLINIC | Age: 32
End: 2022-04-21
Payer: COMMERCIAL

## 2022-04-21 VITALS
HEIGHT: 62 IN | OXYGEN SATURATION: 99 % | DIASTOLIC BLOOD PRESSURE: 74 MMHG | SYSTOLIC BLOOD PRESSURE: 108 MMHG | WEIGHT: 118 LBS | HEART RATE: 83 BPM | BODY MASS INDEX: 21.71 KG/M2

## 2022-04-21 PROCEDURE — 99214 OFFICE O/P EST MOD 30 MIN: CPT

## 2022-04-28 ENCOUNTER — APPOINTMENT (OUTPATIENT)
Dept: OBGYN | Facility: CLINIC | Age: 32
End: 2022-04-28
Payer: COMMERCIAL

## 2022-04-28 VITALS
HEIGHT: 62 IN | DIASTOLIC BLOOD PRESSURE: 70 MMHG | SYSTOLIC BLOOD PRESSURE: 104 MMHG | BODY MASS INDEX: 22.45 KG/M2 | WEIGHT: 122 LBS

## 2022-04-28 DIAGNOSIS — N89.8 OTHER SPECIFIED NONINFLAMMATORY DISORDERS OF VAGINA: ICD-10-CM

## 2022-04-28 LAB
BILIRUB UR QL STRIP: NORMAL
GLUCOSE UR-MCNC: 100
HCG UR QL: 0.2 EU/DL
HCG UR QL: NEGATIVE
HGB UR QL STRIP.AUTO: NORMAL
KETONES UR-MCNC: NORMAL
LEUKOCYTE ESTERASE UR QL STRIP: ABNORMAL
NITRITE UR QL STRIP: NORMAL
PH UR STRIP: 7
PROT UR STRIP-MCNC: NORMAL
QUALITY CONTROL: YES
SP GR UR STRIP: 1

## 2022-04-28 PROCEDURE — 81025 URINE PREGNANCY TEST: CPT

## 2022-04-28 PROCEDURE — 81002 URINALYSIS NONAUTO W/O SCOPE: CPT

## 2022-04-28 PROCEDURE — 99213 OFFICE O/P EST LOW 20 MIN: CPT

## 2022-05-02 ENCOUNTER — APPOINTMENT (OUTPATIENT)
Dept: ANTEPARTUM | Facility: CLINIC | Age: 32
End: 2022-05-02
Payer: COMMERCIAL

## 2022-05-02 ENCOUNTER — ASOB RESULT (OUTPATIENT)
Age: 32
End: 2022-05-02

## 2022-05-02 ENCOUNTER — APPOINTMENT (OUTPATIENT)
Dept: OBGYN | Facility: CLINIC | Age: 32
End: 2022-05-02
Payer: COMMERCIAL

## 2022-05-02 VITALS
WEIGHT: 125 LBS | HEIGHT: 62 IN | SYSTOLIC BLOOD PRESSURE: 102 MMHG | DIASTOLIC BLOOD PRESSURE: 58 MMHG | BODY MASS INDEX: 23 KG/M2 | TEMPERATURE: 96 F

## 2022-05-02 DIAGNOSIS — R10.32 LEFT LOWER QUADRANT PAIN: ICD-10-CM

## 2022-05-02 DIAGNOSIS — N83.291 OTHER OVARIAN CYST, RIGHT SIDE: ICD-10-CM

## 2022-05-02 LAB
APPEARANCE: CLEAR
BACTERIA UR CULT: NORMAL
BACTERIA: NEGATIVE
BILIRUB UR QL STRIP: NEGATIVE
BILIRUBIN URINE: NEGATIVE
BLOOD URINE: NEGATIVE
CANDIDA VAG CYTO: NOT DETECTED
COLOR: COLORLESS
G VAGINALIS+PREV SP MTYP VAG QL MICRO: DETECTED
GLUCOSE QUALITATIVE U: ABNORMAL
GLUCOSE UR-MCNC: NEGATIVE
HCG UR QL: 0.2 EU/DL
HGB UR QL STRIP.AUTO: NEGATIVE
HYALINE CASTS: 4 /LPF
KETONES UR-MCNC: NEGATIVE
KETONES URINE: NEGATIVE
LEUKOCYTE ESTERASE UR QL STRIP: NEGATIVE
LEUKOCYTE ESTERASE URINE: ABNORMAL
MICROSCOPIC-UA: NORMAL
NITRITE UR QL STRIP: NEGATIVE
NITRITE URINE: NEGATIVE
PH UR STRIP: 6.5
PH URINE: 6.5
PROT UR STRIP-MCNC: NEGATIVE
PROTEIN URINE: NEGATIVE
RED BLOOD CELLS URINE: 1 /HPF
SP GR UR STRIP: 1.01
SPECIFIC GRAVITY URINE: 1
SQUAMOUS EPITHELIAL CELLS: 3 /HPF
T VAGINALIS VAG QL WET PREP: NOT DETECTED
UROBILINOGEN URINE: NORMAL
WHITE BLOOD CELLS URINE: 10 /HPF

## 2022-05-02 PROCEDURE — 76830 TRANSVAGINAL US NON-OB: CPT

## 2022-05-02 PROCEDURE — 81003 URINALYSIS AUTO W/O SCOPE: CPT | Mod: QW

## 2022-05-02 PROCEDURE — 99213 OFFICE O/P EST LOW 20 MIN: CPT

## 2022-06-08 ENCOUNTER — NON-APPOINTMENT (OUTPATIENT)
Age: 32
End: 2022-06-08

## 2022-06-14 NOTE — ED STATDOCS - COVID-19  TEST TYPE
MOLECULAR PCR

## 2022-06-15 ENCOUNTER — APPOINTMENT (OUTPATIENT)
Dept: OBGYN | Facility: CLINIC | Age: 32
End: 2022-06-15
Payer: COMMERCIAL

## 2022-06-15 VITALS
DIASTOLIC BLOOD PRESSURE: 68 MMHG | BODY MASS INDEX: 21.71 KG/M2 | SYSTOLIC BLOOD PRESSURE: 110 MMHG | WEIGHT: 118 LBS | HEIGHT: 62 IN

## 2022-06-15 DIAGNOSIS — B96.89 ACUTE VAGINITIS: ICD-10-CM

## 2022-06-15 DIAGNOSIS — N92.6 IRREGULAR MENSTRUATION, UNSPECIFIED: ICD-10-CM

## 2022-06-15 DIAGNOSIS — N92.0 EXCESSIVE AND FREQUENT MENSTRUATION WITH REGULAR CYCLE: ICD-10-CM

## 2022-06-15 DIAGNOSIS — N76.0 ACUTE VAGINITIS: ICD-10-CM

## 2022-06-15 LAB
BILIRUB UR QL STRIP: NORMAL
GLUCOSE UR-MCNC: NORMAL
HCG UR QL: 0.2 EU/DL
HCG UR QL: NEGATIVE
HGB UR QL STRIP.AUTO: NORMAL
KETONES UR-MCNC: NORMAL
LEUKOCYTE ESTERASE UR QL STRIP: NORMAL
NITRITE UR QL STRIP: NORMAL
PH UR STRIP: 7
PROT UR STRIP-MCNC: NORMAL
QUALITY CONTROL: YES
SP GR UR STRIP: 1.01

## 2022-06-15 PROCEDURE — 81003 URINALYSIS AUTO W/O SCOPE: CPT | Mod: QW

## 2022-06-15 PROCEDURE — 81025 URINE PREGNANCY TEST: CPT

## 2022-06-15 PROCEDURE — 99215 OFFICE O/P EST HI 40 MIN: CPT

## 2022-06-16 LAB
ALBUMIN SERPL ELPH-MCNC: 5.3 G/DL
ALP BLD-CCNC: 81 U/L
ALT SERPL-CCNC: 15 U/L
ANION GAP SERPL CALC-SCNC: 16 MMOL/L
AST SERPL-CCNC: 21 U/L
BASOPHILS # BLD AUTO: 0.05 K/UL
BASOPHILS NFR BLD AUTO: 0.8 %
BILIRUB SERPL-MCNC: 0.5 MG/DL
BUN SERPL-MCNC: 15 MG/DL
CALCIUM SERPL-MCNC: 10.5 MG/DL
CANDIDA VAG CYTO: NOT DETECTED
CHLORIDE SERPL-SCNC: 98 MMOL/L
CHOLEST SERPL-MCNC: 230 MG/DL
CO2 SERPL-SCNC: 24 MMOL/L
CREAT SERPL-MCNC: 0.72 MG/DL
DHEA-S SERPL-MCNC: 259 UG/DL
EGFR: 113 ML/MIN/1.73M2
EOSINOPHIL # BLD AUTO: 0.08 K/UL
EOSINOPHIL NFR BLD AUTO: 1.3 %
ESTIMATED AVERAGE GLUCOSE: 94 MG/DL
ESTRADIOL SERPL-MCNC: 51 PG/ML
FSH SERPL-MCNC: 4.7 IU/L
G VAGINALIS+PREV SP MTYP VAG QL MICRO: DETECTED
GLUCOSE SERPL-MCNC: 44 MG/DL
HBA1C MFR BLD HPLC: 4.9 %
HCT VFR BLD CALC: 43.6 %
HDLC SERPL-MCNC: 58 MG/DL
HGB BLD-MCNC: 14.3 G/DL
IMM GRANULOCYTES NFR BLD AUTO: 0.3 %
LDLC SERPL CALC-MCNC: 136 MG/DL
LH SERPL-ACNC: 9.4 IU/L
LYMPHOCYTES # BLD AUTO: 1.58 K/UL
LYMPHOCYTES NFR BLD AUTO: 24.9 %
MAN DIFF?: NORMAL
MCHC RBC-ENTMCNC: 31.6 PG
MCHC RBC-ENTMCNC: 32.8 GM/DL
MCV RBC AUTO: 96.5 FL
MONOCYTES # BLD AUTO: 0.46 K/UL
MONOCYTES NFR BLD AUTO: 7.3 %
NEUTROPHILS # BLD AUTO: 4.15 K/UL
NEUTROPHILS NFR BLD AUTO: 65.4 %
NONHDLC SERPL-MCNC: 172 MG/DL
PLATELET # BLD AUTO: 286 K/UL
POTASSIUM SERPL-SCNC: 4.9 MMOL/L
PROLACTIN SERPL-MCNC: 9.6 NG/ML
PROT SERPL-MCNC: 8.3 G/DL
RBC # BLD: 4.52 M/UL
RBC # FLD: 12.6 %
SODIUM SERPL-SCNC: 138 MMOL/L
T VAGINALIS VAG QL WET PREP: NOT DETECTED
TRIGL SERPL-MCNC: 177 MG/DL
TSH SERPL-ACNC: 2.04 UIU/ML
WBC # FLD AUTO: 6.34 K/UL

## 2022-06-22 LAB — 17OHP SERPL-MCNC: 43 NG/DL

## 2022-06-23 ENCOUNTER — ASOB RESULT (OUTPATIENT)
Age: 32
End: 2022-06-23

## 2022-06-23 ENCOUNTER — APPOINTMENT (OUTPATIENT)
Dept: OBGYN | Facility: CLINIC | Age: 32
End: 2022-06-23
Payer: COMMERCIAL

## 2022-06-23 ENCOUNTER — APPOINTMENT (OUTPATIENT)
Dept: ANTEPARTUM | Facility: CLINIC | Age: 32
End: 2022-06-23
Payer: COMMERCIAL

## 2022-06-23 VITALS
WEIGHT: 293 LBS | BODY MASS INDEX: 53.92 KG/M2 | SYSTOLIC BLOOD PRESSURE: 112 MMHG | DIASTOLIC BLOOD PRESSURE: 68 MMHG | HEIGHT: 62 IN

## 2022-06-23 PROCEDURE — 76830 TRANSVAGINAL US NON-OB: CPT

## 2022-06-23 PROCEDURE — 99214 OFFICE O/P EST MOD 30 MIN: CPT

## 2022-06-30 NOTE — ED PROVIDER NOTE - MEDICAL DECISION MAKING DETAILS
18 year old female with redness, swelling and pain of the right eye.   ? periorbital vs orbital cellulitis.  labs, CT orbit and sinus Protopic Counseling: Patient may experience a mild burning sensation during topical application. Protopic is not approved in children less than 2 years of age. There have been case reports of hematologic and skin malignancies in patients using topical calcineurin inhibitors although causality is questionable.

## 2022-09-06 ENCOUNTER — RX RENEWAL (OUTPATIENT)
Age: 32
End: 2022-09-06

## 2022-10-11 LAB
ALBUMIN SERPL ELPH-MCNC: 5.5 G/DL
ALP BLD-CCNC: 81 U/L
ALT SERPL-CCNC: 15 U/L
ANION GAP SERPL CALC-SCNC: 11 MMOL/L
AST SERPL-CCNC: 21 U/L
BASOPHILS # BLD AUTO: 0.04 K/UL
BASOPHILS NFR BLD AUTO: 0.7 %
BILIRUB SERPL-MCNC: 0.4 MG/DL
BUN SERPL-MCNC: 12 MG/DL
CALCIUM SERPL-MCNC: 10.2 MG/DL
CHLORIDE SERPL-SCNC: 101 MMOL/L
CO2 SERPL-SCNC: 26 MMOL/L
CREAT SERPL-MCNC: 0.82 MG/DL
EGFR: 97 ML/MIN/1.73M2
EOSINOPHIL # BLD AUTO: 0.1 K/UL
EOSINOPHIL NFR BLD AUTO: 1.7 %
GLUCOSE SERPL-MCNC: 93 MG/DL
HCT VFR BLD CALC: 43.1 %
HGB BLD-MCNC: 14.2 G/DL
IMM GRANULOCYTES NFR BLD AUTO: 0.3 %
LYMPHOCYTES # BLD AUTO: 1.92 K/UL
LYMPHOCYTES NFR BLD AUTO: 33.3 %
MAN DIFF?: NORMAL
MCHC RBC-ENTMCNC: 30.7 PG
MCHC RBC-ENTMCNC: 32.9 GM/DL
MCV RBC AUTO: 93.3 FL
MONOCYTES # BLD AUTO: 0.34 K/UL
MONOCYTES NFR BLD AUTO: 5.9 %
NEUTROPHILS # BLD AUTO: 3.35 K/UL
NEUTROPHILS NFR BLD AUTO: 58.1 %
PLATELET # BLD AUTO: 336 K/UL
POTASSIUM SERPL-SCNC: 4.3 MMOL/L
PROT SERPL-MCNC: 8.7 G/DL
RBC # BLD: 4.62 M/UL
RBC # FLD: 11.9 %
SODIUM SERPL-SCNC: 138 MMOL/L
T3FREE SERPL-MCNC: 2.63 PG/ML
T4 FREE SERPL-MCNC: 1.3 NG/DL
TSH SERPL-ACNC: 2.14 UIU/ML
WBC # FLD AUTO: 5.77 K/UL

## 2022-10-13 ENCOUNTER — APPOINTMENT (OUTPATIENT)
Dept: ENDOCRINOLOGY | Facility: CLINIC | Age: 32
End: 2022-10-13

## 2022-10-13 VITALS
HEART RATE: 76 BPM | DIASTOLIC BLOOD PRESSURE: 70 MMHG | BODY MASS INDEX: 22.08 KG/M2 | SYSTOLIC BLOOD PRESSURE: 110 MMHG | HEIGHT: 62 IN | WEIGHT: 120 LBS

## 2022-10-13 PROCEDURE — 99214 OFFICE O/P EST MOD 30 MIN: CPT

## 2022-10-13 RX ORDER — METRONIDAZOLE 7.5 MG/G
0.75 GEL VAGINAL
Qty: 1 | Refills: 0 | Status: DISCONTINUED | COMMUNITY
Start: 2022-05-02 | End: 2022-10-13

## 2022-11-14 ENCOUNTER — NON-APPOINTMENT (OUTPATIENT)
Age: 32
End: 2022-11-14

## 2022-12-06 ENCOUNTER — NON-APPOINTMENT (OUTPATIENT)
Age: 32
End: 2022-12-06

## 2022-12-09 ENCOUNTER — RESULT REVIEW (OUTPATIENT)
Age: 32
End: 2022-12-09

## 2022-12-09 ENCOUNTER — APPOINTMENT (OUTPATIENT)
Dept: MAMMOGRAPHY | Facility: CLINIC | Age: 32
End: 2022-12-09

## 2022-12-09 ENCOUNTER — OUTPATIENT (OUTPATIENT)
Dept: OUTPATIENT SERVICES | Facility: HOSPITAL | Age: 32
LOS: 1 days | End: 2022-12-09
Payer: COMMERCIAL

## 2022-12-09 ENCOUNTER — APPOINTMENT (OUTPATIENT)
Dept: ULTRASOUND IMAGING | Facility: CLINIC | Age: 32
End: 2022-12-09

## 2022-12-09 DIAGNOSIS — Z91.89 OTHER SPECIFIED PERSONAL RISK FACTORS, NOT ELSEWHERE CLASSIFIED: ICD-10-CM

## 2022-12-09 DIAGNOSIS — Z12.39 ENCOUNTER FOR OTHER SCREENING FOR MALIGNANT NEOPLASM OF BREAST: ICD-10-CM

## 2022-12-09 PROCEDURE — 77063 BREAST TOMOSYNTHESIS BI: CPT | Mod: 26

## 2022-12-09 PROCEDURE — 77067 SCR MAMMO BI INCL CAD: CPT | Mod: 26

## 2022-12-09 PROCEDURE — 76641 ULTRASOUND BREAST COMPLETE: CPT

## 2022-12-09 PROCEDURE — 77067 SCR MAMMO BI INCL CAD: CPT

## 2022-12-09 PROCEDURE — 77063 BREAST TOMOSYNTHESIS BI: CPT

## 2022-12-09 PROCEDURE — 76641 ULTRASOUND BREAST COMPLETE: CPT | Mod: 26,50

## 2023-01-26 ENCOUNTER — APPOINTMENT (OUTPATIENT)
Dept: BREAST CENTER | Facility: CLINIC | Age: 33
End: 2023-01-26
Payer: COMMERCIAL

## 2023-01-26 VITALS
SYSTOLIC BLOOD PRESSURE: 102 MMHG | HEIGHT: 62 IN | DIASTOLIC BLOOD PRESSURE: 70 MMHG | HEART RATE: 76 BPM | WEIGHT: 117 LBS | BODY MASS INDEX: 21.53 KG/M2

## 2023-01-26 DIAGNOSIS — Z91.89 OTHER SPECIFIED PERSONAL RISK FACTORS, NOT ELSEWHERE CLASSIFIED: ICD-10-CM

## 2023-01-26 PROCEDURE — 99214 OFFICE O/P EST MOD 30 MIN: CPT

## 2023-02-27 NOTE — OB RN PATIENT PROFILE - PRO MENTAL HEALTH SX RECENT
Behavioral Health Psychotherapy Progress Note    Psychotherapy Provided: Individual Psychotherapy     1  Severe episode of recurrent major depressive disorder, without psychotic features ([address] )        2  DONTE (generalized anxiety disorder)        3  Trauma            Goals addressed in session: Goal 1     DATA: Sheri Olivares presented to therapy by herself today  She expressed that she was supposed to get house, but it fell through due to her past eviction notice  She expressed that nothing is going right and she feels that her  hasn't been helping her and is against her  This provider encouraged her to challenge her thoughts  She stated that she's been having a lot of thoughts lately and is having a difficult time sleeping  She stated that "it would be easier to give up " We processed through this and explored what this looked like  She identified that she thinks about her kids a lot and doesn't want to give up  She stated that she doesn't want to do it anymore, take care of her kids or go to work  She reported that she feels like a failure  This provider continued to explore her thoughts and intentions behind them  Sheri Olivares stated that nothing is going her way and she wants to give up  She denied having a plan to attempt suicide or means to do so  This provider offered her the suicide hotline and directed her to call 911 if she developed a plan  This provider encouraged her to journal and will be referring her for partial through [facility]  She signed an DENISE for this provider to call to call her   During this session, this clinician used the following therapeutic modalities: Client-centered Therapy, Motivational Interviewing and Supportive Psychotherapy    Substance Abuse was not addressed during this session  If the client is diagnosed with a co-occurring substance use disorder, please indicate any changes in the frequency or amount of use: n/a   Stage of change for addressing substance use diagnoses: No substance use/Not applicable    ASSESSMENT:  Magdalena Catalan presents with a Anxious and Depressed mood  her affect is Blunted, Flat and Tearful, which is congruent, with her mood and the content of the session  The client has not made progress on their goals  Elsa Edwards seemed very upset and hopless that things would get better  She continued to feed into her negative thoughts and was experiencing some passive SI  Magdalena Catalan presents with a minimal risk of suicide, minimal risk of self-harm, and minimal risk of harm to others  For any risk assessment that surpasses a "low" rating, a safety plan must be developed  A safety plan was indicated: no  If yes, describe in detail n/a    PLAN: Between sessions, Magdalena Catalan will journal  At the next session, the therapist will use Client-centered Therapy and Supportive Psychotherapy to address her anxiety and depression  Behavioral Health Treatment Plan and Discharge Planning: Magdalena Catalan is aware of and agrees to continue to work on their treatment plan  They have identified and are working toward their discharge goals   yes    Visit start and stop times:    02/27/23  Start Time: 1110  Stop Time: 1153  Total Visit Time: 43 minutes none

## 2023-03-15 ENCOUNTER — APPOINTMENT (OUTPATIENT)
Dept: DERMATOLOGY | Facility: CLINIC | Age: 33
End: 2023-03-15
Payer: COMMERCIAL

## 2023-03-15 PROCEDURE — 99203 OFFICE O/P NEW LOW 30 MIN: CPT

## 2023-03-20 NOTE — DISCHARGE NOTE PROVIDER - NS AS DC PROVIDER CONTACT Y/N MULTI
Patient with Hypoxic Respiratory failure which is Acute on chronic.  he is on home oxygen at 2-3 LPM. Supplemental oxygen was provided and noted- Oxygen Concentration (%):  [28-40] 40.   Signs/symptoms of respiratory failure include- increased work of breathing and hemoptysis. Contributing diagnoses includes - Aspiration and Pneumonia Labs and images were reviewed. Patient Has not had a recent ABG. Will treat underlying causes and adjust management of respiratory failure as follows-     Hemoptysis with increased bilateral infiltrates on chest CTA in the setting of likely aspiration (attempting swallow training with Speech) and chronic aspirin/Plavix.  Although procalcitonin is normal, imaging and history warrant treatment with antibiotics for presumed nosocomial pneumonia.  Moved to ICU due to labile BP and increased oxygen requirements.  After receiving one unit of packed red blood cells, he looks much better and BP is improved.    · Treat for pneumonia (nosocomial pathogens)  · Nebulized bronchodilators given COPD/emphysema changes  · Titrate oxygen as needed => no current indication for ventilatory support; if needed, he will require trach change to a cuffed trach tube.  · No further bleeding noted, no need to continue TXA nebulizer treatments for hemoptysis  · Hold Plavix/ASA for now   Yes

## 2023-03-23 ENCOUNTER — NON-APPOINTMENT (OUTPATIENT)
Age: 33
End: 2023-03-23

## 2023-04-20 ENCOUNTER — APPOINTMENT (OUTPATIENT)
Dept: GASTROENTEROLOGY | Facility: CLINIC | Age: 33
End: 2023-04-20
Payer: COMMERCIAL

## 2023-04-20 VITALS
OXYGEN SATURATION: 98 % | RESPIRATION RATE: 16 BRPM | SYSTOLIC BLOOD PRESSURE: 106 MMHG | HEIGHT: 62 IN | BODY MASS INDEX: 23.19 KG/M2 | HEART RATE: 87 BPM | TEMPERATURE: 98.1 F | WEIGHT: 126 LBS | DIASTOLIC BLOOD PRESSURE: 72 MMHG

## 2023-04-20 DIAGNOSIS — R58 HEMORRHAGE, NOT ELSEWHERE CLASSIFIED: ICD-10-CM

## 2023-04-20 DIAGNOSIS — R19.4 CHANGE IN BOWEL HABIT: ICD-10-CM

## 2023-04-20 DIAGNOSIS — K62.5 HEMORRHAGE OF ANUS AND RECTUM: ICD-10-CM

## 2023-04-20 PROCEDURE — 99204 OFFICE O/P NEW MOD 45 MIN: CPT

## 2023-04-24 ENCOUNTER — OUTPATIENT (OUTPATIENT)
Dept: OUTPATIENT SERVICES | Facility: HOSPITAL | Age: 33
LOS: 1 days | End: 2023-04-24
Payer: COMMERCIAL

## 2023-04-24 ENCOUNTER — APPOINTMENT (OUTPATIENT)
Dept: MRI IMAGING | Facility: CLINIC | Age: 33
End: 2023-04-24
Payer: COMMERCIAL

## 2023-04-24 DIAGNOSIS — R92.8 OTHER ABNORMAL AND INCONCLUSIVE FINDINGS ON DIAGNOSTIC IMAGING OF BREAST: ICD-10-CM

## 2023-04-24 PROCEDURE — 77049 MRI BREAST C-+ W/CAD BI: CPT | Mod: 26

## 2023-04-24 PROCEDURE — C8937: CPT

## 2023-04-24 PROCEDURE — A9585: CPT

## 2023-04-24 PROCEDURE — C8908: CPT

## 2023-05-02 ENCOUNTER — LABORATORY RESULT (OUTPATIENT)
Age: 33
End: 2023-05-02

## 2023-05-02 LAB
ALBUMIN SERPL ELPH-MCNC: 4.8 G/DL
ALP BLD-CCNC: 70 U/L
ALT SERPL-CCNC: 14 U/L
ANION GAP SERPL CALC-SCNC: 11 MMOL/L
AST SERPL-CCNC: 19 U/L
BASOPHILS # BLD AUTO: 0.03 K/UL
BASOPHILS NFR BLD AUTO: 0.5 %
BILIRUB SERPL-MCNC: 0.8 MG/DL
BUN SERPL-MCNC: 12 MG/DL
CALCIUM SERPL-MCNC: 9.2 MG/DL
CHLORIDE SERPL-SCNC: 102 MMOL/L
CO2 SERPL-SCNC: 24 MMOL/L
CREAT SERPL-MCNC: 0.83 MG/DL
CRP SERPL-MCNC: <3 MG/L
EGFR: 95 ML/MIN/1.73M2
EOSINOPHIL # BLD AUTO: 0.06 K/UL
EOSINOPHIL NFR BLD AUTO: 1 %
ERYTHROCYTE [SEDIMENTATION RATE] IN BLOOD BY WESTERGREN METHOD: 2 MM/HR
GLUCOSE SERPL-MCNC: 90 MG/DL
HCT VFR BLD CALC: 38.4 %
HGB BLD-MCNC: 12.9 G/DL
IGA SER QL IEP: 241 MG/DL
IMM GRANULOCYTES NFR BLD AUTO: 0.2 %
INR PPP: 0.98 RATIO
LYMPHOCYTES # BLD AUTO: 1.81 K/UL
LYMPHOCYTES NFR BLD AUTO: 30.7 %
MAN DIFF?: NORMAL
MCHC RBC-ENTMCNC: 31.2 PG
MCHC RBC-ENTMCNC: 33.6 GM/DL
MCV RBC AUTO: 93 FL
MONOCYTES # BLD AUTO: 0.43 K/UL
MONOCYTES NFR BLD AUTO: 7.3 %
NEUTROPHILS # BLD AUTO: 3.56 K/UL
NEUTROPHILS NFR BLD AUTO: 60.3 %
PLATELET # BLD AUTO: 281 K/UL
POTASSIUM SERPL-SCNC: 4 MMOL/L
PROT SERPL-MCNC: 7.6 G/DL
PT BLD: 11.6 SEC
RBC # BLD: 4.13 M/UL
RBC # FLD: 11.9 %
SODIUM SERPL-SCNC: 137 MMOL/L
TSH SERPL-ACNC: 3.63 UIU/ML
WBC # FLD AUTO: 5.9 K/UL

## 2023-05-03 LAB
GLIADIN IGA SER QL: <5 UNITS
GLIADIN IGG SER QL: <5 UNITS
GLIADIN PEPTIDE IGA SER-ACNC: NEGATIVE
GLIADIN PEPTIDE IGG SER-ACNC: NEGATIVE
TTG IGA SER IA-ACNC: <1.2 U/ML
TTG IGA SER-ACNC: NEGATIVE

## 2023-05-09 LAB
CELIAC DISEASE INTERPRETATION: NORMAL
CELIAC GENE PAIRS PRESENT: NORMAL
DQ ALPHA 1: NORMAL
DQ BETA 1: NORMAL
ENDOMYSIUM IGA SER QL: NEGATIVE
ENDOMYSIUM IGA TITR SER: NORMAL
IMMUNOGLOBULIN A (IGA): 221 MG/DL

## 2023-05-22 ENCOUNTER — APPOINTMENT (OUTPATIENT)
Dept: RHEUMATOLOGY | Facility: CLINIC | Age: 33
End: 2023-05-22
Payer: COMMERCIAL

## 2023-05-22 VITALS
SYSTOLIC BLOOD PRESSURE: 115 MMHG | OXYGEN SATURATION: 100 % | HEART RATE: 80 BPM | TEMPERATURE: 97.2 F | HEIGHT: 62 IN | WEIGHT: 120 LBS | BODY MASS INDEX: 22.08 KG/M2 | DIASTOLIC BLOOD PRESSURE: 71 MMHG

## 2023-05-22 DIAGNOSIS — R76.8 OTHER SPECIFIED ABNORMAL IMMUNOLOGICAL FINDINGS IN SERUM: ICD-10-CM

## 2023-05-22 DIAGNOSIS — L65.9 NONSCARRING HAIR LOSS, UNSPECIFIED: ICD-10-CM

## 2023-05-22 PROCEDURE — 36415 COLL VENOUS BLD VENIPUNCTURE: CPT

## 2023-05-22 PROCEDURE — 99204 OFFICE O/P NEW MOD 45 MIN: CPT | Mod: 25

## 2023-05-23 LAB
ALBUMIN SERPL ELPH-MCNC: 5.2 G/DL
ALP BLD-CCNC: 76 U/L
ALT SERPL-CCNC: 6 U/L
ANA PAT FLD IF-IMP: NORMAL
ANA SER IF-ACNC: ABNORMAL
ANION GAP SERPL CALC-SCNC: 12 MMOL/L
AST SERPL-CCNC: 19 U/L
BILIRUB SERPL-MCNC: 0.2 MG/DL
BUN SERPL-MCNC: 11 MG/DL
C3 SERPL-MCNC: 124 MG/DL
C4 SERPL-MCNC: 26 MG/DL
CALCIUM SERPL-MCNC: 9.9 MG/DL
CHLORIDE SERPL-SCNC: 102 MMOL/L
CO2 SERPL-SCNC: 24 MMOL/L
CREAT SERPL-MCNC: 0.61 MG/DL
CRP SERPL-MCNC: <3 MG/L
DSDNA AB SER-ACNC: <12 IU/ML
EGFR: 120 ML/MIN/1.73M2
ERYTHROCYTE [SEDIMENTATION RATE] IN BLOOD BY WESTERGREN METHOD: 9 MM/HR
GLUCOSE SERPL-MCNC: 88 MG/DL
POTASSIUM SERPL-SCNC: 4.2 MMOL/L
PROT SERPL-MCNC: 7.8 G/DL
SODIUM SERPL-SCNC: 138 MMOL/L
THYROGLOB AB SERPL-ACNC: 56.7 IU/ML
THYROPEROXIDASE AB SERPL IA-ACNC: <10 IU/ML

## 2023-05-24 LAB
ENA RNP AB SER IA-ACNC: <0.2 AL
ENA SM AB SER IA-ACNC: <0.2 AL
ENA SS-A AB SER IA-ACNC: <0.2 AL
ENA SS-B AB SER IA-ACNC: <0.2 AL

## 2023-06-05 NOTE — ED ADULT TRIAGE NOTE - WEIGHT METHOD
stated Azelaic Acid Pregnancy And Lactation Text: This medication is considered safe during pregnancy and breast feeding.

## 2023-06-07 NOTE — OB RN DELIVERY SUMMARY - NS_RESUSCITEFFORT_OBGYN_ALL_OB
Case Management following for discharge planning and coordination.      CM notified in OFT's by MD of possible transfer. Writer spoke with Roz CHUA RN who stated that plan is for pt to transfer  to [place] after results of pathology are finalized and plan is in place. Per Roz she is working on this and will handle the coordination of this transfer.    Kim Duque MSN, RN   - [facility]  [phone]   Bulb Cynthia-Pharynx Suction Only

## 2023-06-27 ENCOUNTER — APPOINTMENT (OUTPATIENT)
Dept: GASTROENTEROLOGY | Facility: GI CENTER | Age: 33
End: 2023-06-27

## 2023-07-14 ENCOUNTER — NON-APPOINTMENT (OUTPATIENT)
Age: 33
End: 2023-07-14

## 2023-07-27 NOTE — ED ADULT TRIAGE NOTE - STATUS:
Advance Care Planning   Healthcare Decision Maker:    Primary Decision Maker: [address] - [phone]    Secondary Decision Maker: [place] - Child  [phone]    Click here to complete Healthcare Decision Makers including selection of the Healthcare Decision Maker Relationship (ie \"Primary\"). Today we documented Decision Maker(s) consistent with Legal Next of Kin hierarchy.        If the relationship to the patient does NOT follow our state's Next of Kin hierarchy, the patient MUST complete an ACP Document to allow him/her to act on the patient's behalf. :
Intact

## 2023-08-08 ENCOUNTER — RX RENEWAL (OUTPATIENT)
Age: 33
End: 2023-08-08

## 2023-10-10 NOTE — ED STATDOCS - PHYSICAL EXAMINATION
VITAL SIGNS: I have reviewed nursing notes and confirm.  CONSTITUTIONAL:  in no acute distress.  SKIN: Skin exam is warm and dry, no acute rash. (+) biopsy bark left upper breast. no surrounding ecchymosis. breast tissue soft.   HEAD: Normocephalic; atraumatic.  EYES: PERRL, EOM intact; conjunctiva and sclera clear.  ENT: No nasal discharge; airway clear. Throat clear.  NECK: Supple; non tender.    CARD: Regular rate and rhythm.  RESP: No wheezes,  no rales or rhonchi.   ABD:  soft; non-distended; non-tender;   EXT: Normal ROM. No clubbing, cyanosis or edema.  NEURO: Alert, oriented. Grossly unremarkable. No focal deficits.  moves all extremities,  normal gait   PSYCH: Cooperative, appropriate. DISPLAY PLAN FREE TEXT

## 2023-10-11 ENCOUNTER — LABORATORY RESULT (OUTPATIENT)
Age: 33
End: 2023-10-11

## 2023-10-12 ENCOUNTER — APPOINTMENT (OUTPATIENT)
Dept: ENDOCRINOLOGY | Facility: CLINIC | Age: 33
End: 2023-10-12
Payer: COMMERCIAL

## 2023-10-12 VITALS
HEIGHT: 62 IN | OXYGEN SATURATION: 98 % | DIASTOLIC BLOOD PRESSURE: 70 MMHG | HEART RATE: 96 BPM | WEIGHT: 134 LBS | BODY MASS INDEX: 24.66 KG/M2 | SYSTOLIC BLOOD PRESSURE: 110 MMHG

## 2023-10-12 DIAGNOSIS — E04.1 NONTOXIC SINGLE THYROID NODULE: ICD-10-CM

## 2023-10-12 DIAGNOSIS — E06.3 AUTOIMMUNE THYROIDITIS: ICD-10-CM

## 2023-10-12 DIAGNOSIS — E07.9 ENDOCRINE, NUTRITIONAL AND METABOLIC DISEASES COMPLICATING THE PUERPERIUM: ICD-10-CM

## 2023-10-12 PROCEDURE — 99214 OFFICE O/P EST MOD 30 MIN: CPT

## 2023-10-12 RX ORDER — SODIUM SULFATE, POTASSIUM SULFATE AND MAGNESIUM SULFATE 1.6; 3.13; 17.5 G/177ML; G/177ML; G/177ML
17.5-3.13-1.6 SOLUTION ORAL
Qty: 1 | Refills: 0 | Status: DISCONTINUED | COMMUNITY
Start: 2023-04-20 | End: 2023-10-12

## 2023-10-12 RX ORDER — HYDROCORTISONE 25 MG/G
2.5 CREAM TOPICAL TWICE DAILY
Qty: 1 | Refills: 3 | Status: DISCONTINUED | COMMUNITY
Start: 2023-04-20 | End: 2023-10-12

## 2023-10-13 PROBLEM — O24.410 GESTATIONAL DIABETES MELLITUS IN PREGNANCY, DIET CONTROLLED: Chronic | Status: ACTIVE | Noted: 2020-03-09

## 2023-10-17 ENCOUNTER — NON-APPOINTMENT (OUTPATIENT)
Age: 33
End: 2023-10-17

## 2023-10-29 NOTE — OB PROVIDER H&P - NSTOBACCOSCREENHP_GEN_A_NCS
Body mass index is 48.08 kg/m². Morbid obesity complicates all aspects of disease management from diagnostic modalities to treatment. Weight loss encouraged and health benefits explained to patient.        No

## 2023-11-01 ENCOUNTER — RX RENEWAL (OUTPATIENT)
Age: 33
End: 2023-11-01

## 2023-11-01 RX ORDER — LEVOTHYROXINE SODIUM 0.03 MG/1
25 TABLET ORAL
Qty: 90 | Refills: 3 | Status: ACTIVE | COMMUNITY
Start: 2019-11-25 | End: 1900-01-01

## 2023-11-16 PROBLEM — Z33.2 ENCOUNTER FOR ELECTIVE TERMINATION OF PREGNANCY: Chronic | Status: ACTIVE | Noted: 2020-03-09

## 2023-11-16 PROBLEM — O99.283 ENDOCRINE, NUTRITIONAL AND METABOLIC DISEASES COMPLICATING PREGNANCY, THIRD TRIMESTER: Chronic | Status: ACTIVE | Noted: 2020-03-09

## 2023-12-30 ENCOUNTER — APPOINTMENT (OUTPATIENT)
Dept: OBGYN | Facility: CLINIC | Age: 33
End: 2023-12-30
Payer: COMMERCIAL

## 2023-12-30 VITALS
HEIGHT: 62 IN | BODY MASS INDEX: 22.08 KG/M2 | WEIGHT: 120 LBS | SYSTOLIC BLOOD PRESSURE: 110 MMHG | DIASTOLIC BLOOD PRESSURE: 66 MMHG

## 2023-12-30 DIAGNOSIS — R92.8 OTHER ABNORMAL AND INCONCLUSIVE FINDINGS ON DIAGNOSTIC IMAGING OF BREAST: ICD-10-CM

## 2023-12-30 DIAGNOSIS — Z12.4 ENCOUNTER FOR SCREENING FOR MALIGNANT NEOPLASM OF CERVIX: ICD-10-CM

## 2023-12-30 DIAGNOSIS — Z12.39 ENCOUNTER FOR OTHER SCREENING FOR MALIGNANT NEOPLASM OF BREAST: ICD-10-CM

## 2023-12-30 DIAGNOSIS — R92.30 INCONCLUSIVE MAMMOGRAM: ICD-10-CM

## 2023-12-30 DIAGNOSIS — Z01.419 ENCOUNTER FOR GYNECOLOGICAL EXAMINATION (GENERAL) (ROUTINE) W/OUT ABNORMAL FINDINGS: ICD-10-CM

## 2023-12-30 DIAGNOSIS — R92.2 INCONCLUSIVE MAMMOGRAM: ICD-10-CM

## 2023-12-30 PROCEDURE — 99395 PREV VISIT EST AGE 18-39: CPT

## 2023-12-30 NOTE — HISTORY OF PRESENT ILLNESS
[Menarche Age: ____] : age at menarche was [unfilled] [No] : Patient does not have concerns regarding sex [Currently Active] : currently active [N] : Patient reports normal menses [Vasectomy (partner)] : has a partner with a vasectomy [Y] : Patient is sexually active [Mammogramdate] : 12/14/22 [TextBox_19] : BR 2  [BreastSonogramDate] : 12/14/22 [TextBox_25] : BR 2  [PapSmeardate] : 09/24/21 [TextBox_31] : NEG  [HPVDate] : 09/24/21 [TextBox_78] : NEG  [LMPDate] : 12/21/23 [PGHxTotal] : 4 [[facility]Full[facility]] : 2 [[facility]xLiving] : 2 [PGHxABSpont] : 2 [FreeTextEntry1] : 12/21/23

## 2024-01-06 LAB
CYTOLOGY CVX/VAG DOC THIN PREP: NORMAL
HPV HIGH+LOW RISK DNA PNL CVX: NOT DETECTED

## 2024-01-25 ENCOUNTER — APPOINTMENT (OUTPATIENT)
Dept: ULTRASOUND IMAGING | Facility: CLINIC | Age: 34
End: 2024-01-25
Payer: COMMERCIAL

## 2024-01-25 ENCOUNTER — APPOINTMENT (OUTPATIENT)
Dept: MAMMOGRAPHY | Facility: CLINIC | Age: 34
End: 2024-01-25
Payer: COMMERCIAL

## 2024-01-25 ENCOUNTER — OUTPATIENT (OUTPATIENT)
Dept: OUTPATIENT SERVICES | Facility: HOSPITAL | Age: 34
LOS: 1 days | End: 2024-01-25
Payer: SELF-PAY

## 2024-01-25 ENCOUNTER — RESULT REVIEW (OUTPATIENT)
Age: 34
End: 2024-01-25

## 2024-01-25 DIAGNOSIS — Z12.39 ENCOUNTER FOR OTHER SCREENING FOR MALIGNANT NEOPLASM OF BREAST: ICD-10-CM

## 2024-01-25 DIAGNOSIS — R92.30 DENSE BREASTS, UNSPECIFIED: ICD-10-CM

## 2024-01-25 PROCEDURE — 76641 ULTRASOUND BREAST COMPLETE: CPT | Mod: 26,50

## 2024-01-25 PROCEDURE — 76641 ULTRASOUND BREAST COMPLETE: CPT

## 2024-01-25 PROCEDURE — 77063 BREAST TOMOSYNTHESIS BI: CPT | Mod: 26

## 2024-01-25 PROCEDURE — 77067 SCR MAMMO BI INCL CAD: CPT

## 2024-01-25 PROCEDURE — 77067 SCR MAMMO BI INCL CAD: CPT | Mod: 26

## 2024-01-25 PROCEDURE — 77063 BREAST TOMOSYNTHESIS BI: CPT

## 2024-04-05 NOTE — OB RN PATIENT PROFILE - NS_TRIAGEARRIVAL_OBGYN_ALL_OB_DT
ADULT ANNUAL PHYSICAL  [facility] INTERNAL MEDICINE    NAME: Reid Jiang  AGE: 57 y.o. SEX: male  : 1967     DATE: 2024     Assessment and Plan:     Problem List Items Addressed This Visit    None      Immunizations and preventive care screenings were discussed with patient today. Appropriate education was printed on patient's after visit summary.    Discussed risks and benefits of prostate cancer screening. We discussed the controversial history of PSA screening for prostate cancer in the [place] as well as the risk of over detection and over treatment of prostate cancer by way of PSA screening.  The patient understands that PSA blood testing is an imperfect way to screen for prostate cancer and that elevated PSA levels in the blood may also be caused by infection, inflammation, prostatic trauma or manipulation, urological procedures, or by benign prostatic enlargement.    The role of the digital rectal examination in prostate cancer screening was also discussed and I discussed with him that there is large interobserver variability in the findings of digital rectal examination.    Counseling:  Alcohol/drug use: discussed moderation in alcohol intake, the recommendations for healthy alcohol use, and avoidance of illicit drug use.         No follow-ups on file.     Chief Complaint:     Chief Complaint   Patient presents with    Follow-up     4 month follow up     Annual Exam    Physical Exam      History of Present Illness:     Adult Annual Physical   Patient here for a comprehensive physical exam. The patient reports no problems.    Diet and Physical Activity  Diet/Nutrition: well balanced diet.   Exercise: walking.      Depression Screening  PHQ-2/9 Depression Screening    Little interest or pleasure in doing things: 0 - not at all  Feeling down, depressed, or hopeless: 0 - not at all  PHQ-2 Score: 0  PHQ-2 Interpretation: Negative depression  screen       General Health  Sleep: sleeps well.   Hearing: normal - bilateral.  Vision: no vision problems.   Dental: regular dental visits.        Health  Symptoms include: none    Advanced Care Planning  Do you have an advanced directive? no  Do you have a durable medical power of ? no  ACP document given to patient? no     Review of Systems:     Review of Systems   Constitutional:  Negative for chills and fever.   HENT:  Negative for congestion, ear pain, hearing loss, nosebleeds, sinus pain, sore throat and trouble swallowing.    Eyes:  Negative for discharge, redness and visual disturbance.   Respiratory:  Negative for cough, chest tightness and shortness of breath.    Cardiovascular:  Negative for chest pain and palpitations.   Gastrointestinal:  Negative for abdominal pain, blood in stool, constipation, diarrhea, nausea and vomiting.   Genitourinary:  Negative for dysuria, flank pain and hematuria.   Musculoskeletal:  Positive for arthralgias (left shoulder pain). Negative for neck pain.   Skin:  Negative for color change and rash.   Neurological:  Negative for dizziness, speech difficulty, weakness and headaches.   Hematological:  Does not bruise/bleed easily.   Psychiatric/Behavioral:  Negative for agitation and behavioral problems.         Past Medical History:     Past Medical History:   Diagnosis Date    Acute pain of left shoulder 2/27/2023    BMI 33.0-33.9,adult 5/25/2023    BMI 36.0-36.9,adult 04/27/2021    BMI 37.0-37.9, adult 01/26/2021    Bradycardia     Bronchitis 1/5/2023    Burning sensation of lower extremity 5/25/2023    Chronic left shoulder pain 8/28/2023    Elevated PSA 04/27/2021    Hypercholesteremia 01/23/2021    Hyperglycemia 01/23/2021    Hyperplastic polyp of sigmoid colon 04/27/2021    Hypertension     Low TSH level 01/23/2021    Numbness and tingling of right leg 5/25/2023    Other insomnia 04/27/2021    Prediabetes 10/26/2021    Skin rash 4/26/2022    Weight gain  01/23/2021      Past Surgical History:     Past Surgical History:   Procedure Laterality Date    COLONOSCOPY  2013    [place], [place]     HEMORROIDECTOMY      [id number]     HERNIA REPAIR Bilateral     In childhood     SHOULDER ARTHROSCOPY W/ ROTATOR CUFF REPAIR Left 03/2023    SHOULDER SURGERY Left     Pain - impingement    WISDOM TOOTH EXTRACTION        Family History:     Family History   Problem Relation Age of Onset    Hypertension Mother     Other Father         pacemaker     Hypertension Father     Prostate cancer Father         diagnosed in his 60s     No Known Problems Sister     No Known Problems Brother     Diabetes type II Maternal Uncle     Diabetes type II Maternal Uncle     Dialysis Maternal Uncle     No Known Problems Paternal Uncle     No Known Problems Maternal Grandmother     No Known Problems Maternal Grandfather     No Known Problems Paternal Grandmother     No Known Problems Paternal Grandfather     Prostate cancer Family     No Known Problems Daughter       Social History:     Social History     Socioeconomic History    Marital status: Single     Spouse name: None    Number of children: 1    Years of education: None    Highest education level: None   Occupational History     Employer: [employer]   Tobacco Use    Smoking status: Never     Passive exposure: Past    Smokeless tobacco: Never   Vaping Use    Vaping status: Never Used   Substance and Sexual Activity    Alcohol use: Yes     Alcohol/week: 4.0 standard drinks of alcohol     Types: 4 Standard drinks or equivalent per week     Comment: Occasional     Drug use: Never     Comment: No dug use - As per [employer]    Sexual activity: Yes   Other Topics Concern    None   Social History Narrative    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    Lives with his mom        Sees urology in [place]; last seen 2013 in [place], [place]    - As per [employer]     Social Determinants of Health     Financial Resource Strain: Not on file   Food  "Insecurity: Not on file   Transportation Needs: Not on file   Physical Activity: Not on file   Stress: Not on file   Social Connections: Not on file   Intimate Partner Violence: Not on file   Housing Stability: Not on file      Current Medications:     Current Outpatient Medications   Medication Sig Dispense Refill    amLODIPine (NORVASC) 5 mg tablet Take 1 tablet (5 mg total) by mouth daily 90 tablet 1    Blood Pressure Monitoring (Blood Pressure Kit) KIT Use daily Adult 1 each 0    Cholecalciferol (D3) 50 MCG (2000 UT) TABS       CINNAMON PO Take by mouth daily      Multiple Vitamin (multivitamin) capsule Take 1 capsule by mouth daily      pyridoxine (VITAMIN B6) 100 mg tablet Take 100 mg by mouth daily      rosuvastatin (CRESTOR) 5 mg tablet TAKE 1 TABLET (5 MG TOTAL) BY MOUTH DAILY. 90 tablet 1     No current facility-administered medications for this visit.      Allergies:     Allergies   Allergen Reactions    Penicillin G Swelling    Penicillins Swelling      Physical Exam:     /90 (BP Location: Left arm, Patient Position: Sitting, Cuff Size: Large)   Pulse 80   Temp (!) 97.4 °F (36.3 °C) (Temporal)   Resp 16   Ht 5' 10\" (1.778 m)   Wt 117 kg (257 lb)   SpO2 98%   BMI 36.88 kg/m²     Physical Exam     Sanchez Webb MD  [facility] INTERNAL MEDICINE    " 22-Feb-2019 07:44

## 2024-05-08 ENCOUNTER — NON-APPOINTMENT (OUTPATIENT)
Age: 34
End: 2024-05-08

## 2024-09-25 ENCOUNTER — RX RENEWAL (OUTPATIENT)
Age: 34
End: 2024-09-25

## 2024-10-18 ENCOUNTER — EMERGENCY (EMERGENCY)
Facility: HOSPITAL | Age: 34
LOS: 1 days | Discharge: HOME | End: 2024-10-18
Attending: STUDENT IN AN ORGANIZED HEALTH CARE EDUCATION/TRAINING PROGRAM
Payer: COMMERCIAL

## 2024-10-18 ENCOUNTER — RESULT REVIEW (OUTPATIENT)
Age: 34
End: 2024-10-18

## 2024-10-18 VITALS
WEIGHT: 122.36 LBS | OXYGEN SATURATION: 100 % | TEMPERATURE: 98 F | RESPIRATION RATE: 18 BRPM | HEART RATE: 84 BPM | SYSTOLIC BLOOD PRESSURE: 127 MMHG | DIASTOLIC BLOOD PRESSURE: 86 MMHG | HEIGHT: 62 IN

## 2024-10-18 VITALS
OXYGEN SATURATION: 99 % | TEMPERATURE: 98 F | RESPIRATION RATE: 18 BRPM | DIASTOLIC BLOOD PRESSURE: 63 MMHG | SYSTOLIC BLOOD PRESSURE: 100 MMHG | HEART RATE: 72 BPM

## 2024-10-18 DIAGNOSIS — R07.9 CHEST PAIN, UNSPECIFIED: ICD-10-CM

## 2024-10-18 LAB
ALBUMIN SERPL ELPH-MCNC: 4.5 G/DL — SIGNIFICANT CHANGE UP (ref 3.3–5.2)
ALP SERPL-CCNC: 75 U/L — SIGNIFICANT CHANGE UP (ref 40–120)
ALT FLD-CCNC: 11 U/L — SIGNIFICANT CHANGE UP
ANION GAP SERPL CALC-SCNC: 12 MMOL/L — SIGNIFICANT CHANGE UP (ref 5–17)
APPEARANCE UR: CLEAR — SIGNIFICANT CHANGE UP
APTT BLD: 35.3 SEC — SIGNIFICANT CHANGE UP (ref 24.5–35.6)
AST SERPL-CCNC: 19 U/L — SIGNIFICANT CHANGE UP
BASOPHILS # BLD AUTO: 0.03 K/UL — SIGNIFICANT CHANGE UP (ref 0–0.2)
BASOPHILS NFR BLD AUTO: 0.4 % — SIGNIFICANT CHANGE UP (ref 0–2)
BILIRUB SERPL-MCNC: 0.4 MG/DL — SIGNIFICANT CHANGE UP (ref 0.4–2)
BILIRUB UR-MCNC: NEGATIVE — SIGNIFICANT CHANGE UP
BUN SERPL-MCNC: 13.2 MG/DL — SIGNIFICANT CHANGE UP (ref 8–20)
CALCIUM SERPL-MCNC: 9.5 MG/DL — SIGNIFICANT CHANGE UP (ref 8.4–10.5)
CHLORIDE SERPL-SCNC: 99 MMOL/L — SIGNIFICANT CHANGE UP (ref 96–108)
CO2 SERPL-SCNC: 23 MMOL/L — SIGNIFICANT CHANGE UP (ref 22–29)
COLOR SPEC: YELLOW — SIGNIFICANT CHANGE UP
CREAT SERPL-MCNC: 0.65 MG/DL — SIGNIFICANT CHANGE UP (ref 0.5–1.3)
DIFF PNL FLD: NEGATIVE — SIGNIFICANT CHANGE UP
EGFR: 118 ML/MIN/1.73M2 — SIGNIFICANT CHANGE UP
EOSINOPHIL # BLD AUTO: 0.06 K/UL — SIGNIFICANT CHANGE UP (ref 0–0.5)
EOSINOPHIL NFR BLD AUTO: 0.8 % — SIGNIFICANT CHANGE UP (ref 0–6)
GLUCOSE SERPL-MCNC: 93 MG/DL — SIGNIFICANT CHANGE UP (ref 70–99)
GLUCOSE UR QL: NEGATIVE MG/DL — SIGNIFICANT CHANGE UP
HCG SERPL-ACNC: <4 MIU/ML — SIGNIFICANT CHANGE UP
HCT VFR BLD CALC: 37.1 % — SIGNIFICANT CHANGE UP (ref 34.5–45)
HGB BLD-MCNC: 12.6 G/DL — SIGNIFICANT CHANGE UP (ref 11.5–15.5)
HIV 1 & 2 AB SERPL IA.RAPID: SIGNIFICANT CHANGE UP
IMM GRANULOCYTES NFR BLD AUTO: 0.4 % — SIGNIFICANT CHANGE UP (ref 0–0.9)
INR BLD: 0.96 RATIO — SIGNIFICANT CHANGE UP (ref 0.85–1.16)
KETONES UR-MCNC: NEGATIVE MG/DL — SIGNIFICANT CHANGE UP
LEUKOCYTE ESTERASE UR-ACNC: NEGATIVE — SIGNIFICANT CHANGE UP
LYMPHOCYTES # BLD AUTO: 1.41 K/UL — SIGNIFICANT CHANGE UP (ref 1–3.3)
LYMPHOCYTES # BLD AUTO: 17.6 % — SIGNIFICANT CHANGE UP (ref 13–44)
MCHC RBC-ENTMCNC: 31.1 PG — SIGNIFICANT CHANGE UP (ref 27–34)
MCHC RBC-ENTMCNC: 34 GM/DL — SIGNIFICANT CHANGE UP (ref 32–36)
MCV RBC AUTO: 91.6 FL — SIGNIFICANT CHANGE UP (ref 80–100)
MONOCYTES # BLD AUTO: 0.51 K/UL — SIGNIFICANT CHANGE UP (ref 0–0.9)
MONOCYTES NFR BLD AUTO: 6.4 % — SIGNIFICANT CHANGE UP (ref 2–14)
NEUTROPHILS # BLD AUTO: 5.95 K/UL — SIGNIFICANT CHANGE UP (ref 1.8–7.4)
NEUTROPHILS NFR BLD AUTO: 74.4 % — SIGNIFICANT CHANGE UP (ref 43–77)
NITRITE UR-MCNC: NEGATIVE — SIGNIFICANT CHANGE UP
PH UR: 7 — SIGNIFICANT CHANGE UP (ref 5–8)
PLATELET # BLD AUTO: 252 K/UL — SIGNIFICANT CHANGE UP (ref 150–400)
POTASSIUM SERPL-MCNC: 3.8 MMOL/L — SIGNIFICANT CHANGE UP (ref 3.5–5.3)
POTASSIUM SERPL-SCNC: 3.8 MMOL/L — SIGNIFICANT CHANGE UP (ref 3.5–5.3)
PROT SERPL-MCNC: 7.8 G/DL — SIGNIFICANT CHANGE UP (ref 6.6–8.7)
PROT UR-MCNC: NEGATIVE MG/DL — SIGNIFICANT CHANGE UP
PROTHROM AB SERPL-ACNC: 10.9 SEC — SIGNIFICANT CHANGE UP (ref 9.9–13.4)
RBC # BLD: 4.05 M/UL — SIGNIFICANT CHANGE UP (ref 3.8–5.2)
RBC # FLD: 11.2 % — SIGNIFICANT CHANGE UP (ref 10.3–14.5)
SODIUM SERPL-SCNC: 134 MMOL/L — LOW (ref 135–145)
SP GR SPEC: >1.03 — HIGH (ref 1–1.03)
TROPONIN T, HIGH SENSITIVITY RESULT: <6 NG/L — SIGNIFICANT CHANGE UP (ref 0–51)
TROPONIN T, HIGH SENSITIVITY RESULT: <6 NG/L — SIGNIFICANT CHANGE UP (ref 0–51)
UROBILINOGEN FLD QL: 0.2 MG/DL — SIGNIFICANT CHANGE UP (ref 0.2–1)
WBC # BLD: 7.99 K/UL — SIGNIFICANT CHANGE UP (ref 3.8–10.5)
WBC # FLD AUTO: 7.99 K/UL — SIGNIFICANT CHANGE UP (ref 3.8–10.5)

## 2024-10-18 PROCEDURE — 71046 X-RAY EXAM CHEST 2 VIEWS: CPT

## 2024-10-18 PROCEDURE — 85730 THROMBOPLASTIN TIME PARTIAL: CPT

## 2024-10-18 PROCEDURE — 93306 TTE W/DOPPLER COMPLETE: CPT

## 2024-10-18 PROCEDURE — 71046 X-RAY EXAM CHEST 2 VIEWS: CPT | Mod: 26

## 2024-10-18 PROCEDURE — 84484 ASSAY OF TROPONIN QUANT: CPT

## 2024-10-18 PROCEDURE — 86703 HIV-1/HIV-2 1 RESULT ANTBDY: CPT

## 2024-10-18 PROCEDURE — 93010 ELECTROCARDIOGRAM REPORT: CPT

## 2024-10-18 PROCEDURE — G0378: CPT

## 2024-10-18 PROCEDURE — 75574 CT ANGIO HRT W/3D IMAGE: CPT | Mod: 26,MC

## 2024-10-18 PROCEDURE — 87086 URINE CULTURE/COLONY COUNT: CPT

## 2024-10-18 PROCEDURE — 84702 CHORIONIC GONADOTROPIN TEST: CPT

## 2024-10-18 PROCEDURE — 93005 ELECTROCARDIOGRAM TRACING: CPT

## 2024-10-18 PROCEDURE — 80053 COMPREHEN METABOLIC PANEL: CPT

## 2024-10-18 PROCEDURE — 85610 PROTHROMBIN TIME: CPT

## 2024-10-18 PROCEDURE — 99285 EMERGENCY DEPT VISIT HI MDM: CPT | Mod: 25

## 2024-10-18 PROCEDURE — 81003 URINALYSIS AUTO W/O SCOPE: CPT

## 2024-10-18 PROCEDURE — 99284 EMERGENCY DEPT VISIT MOD MDM: CPT

## 2024-10-18 PROCEDURE — 99223 1ST HOSP IP/OBS HIGH 75: CPT

## 2024-10-18 PROCEDURE — 86803 HEPATITIS C AB TEST: CPT

## 2024-10-18 PROCEDURE — 85025 COMPLETE CBC W/AUTO DIFF WBC: CPT

## 2024-10-18 PROCEDURE — 36415 COLL VENOUS BLD VENIPUNCTURE: CPT

## 2024-10-18 PROCEDURE — 93306 TTE W/DOPPLER COMPLETE: CPT | Mod: 26

## 2024-10-18 PROCEDURE — 75574 CT ANGIO HRT W/3D IMAGE: CPT | Mod: MC

## 2024-10-18 RX ORDER — METOPROLOL TARTRATE 50 MG
100 TABLET ORAL ONCE
Refills: 0 | Status: COMPLETED | OUTPATIENT
Start: 2024-10-18 | End: 2024-10-18

## 2024-10-18 RX ADMIN — Medication 100 MILLIGRAM(S): at 14:20

## 2024-10-18 NOTE — ED CDU PROVIDER INITIAL DAY NOTE - CLINICAL SUMMARY MEDICAL DECISION MAKING FREE TEXT BOX
Pt w/ pmhx of hypothyroid and FMHx of MI in early 30s presented w/ chest pain. cardiology recommending serial trop, echo versus cardiac CT. Echo and cardiac CT ordered for now. home meds resumed including synthroid 25mcg in the morning and bactrim for UTI being treated outpatient. Will continue to monitor on tele

## 2024-10-18 NOTE — CONSULT NOTE ADULT - SUBJECTIVE AND OBJECTIVE BOX
[facility]                                              CARDIOLOGY AT [place] [address]                                             Telephone: [phone]. Fax:[phone]                                                       CARDIOLOGY CONSULTATION NOTE                                                                                             History obtained by: Patient and medical record  Community Cardiologist:   None   obtained: Yes [  ] No [  ]  Reason for Consultation: Chest pain  Available out pt records reviewed: Yes [  ] No [ x ]    Chief complaint:    Patient is a 34y old  Female who presents with a chief complaint of  Chest pain    HPI:  33yo female PMH:  Non cardiac, gestational diabetes, hashimoto's, and family history of MI in father and grandfather under 40.   Patient developed upper right/midsternal chest pain 4 days ago, pain has steadily increased, started at 3 and now is up to an 8, not relieved, non reproducible, and legs are numb, no other associated symptoms.  Patient states she went to an outpatient clinic 2 weeks ago for lightheadedness, which has continued until today.    Patient is also being treated for a urinary tract infection.   for  Patient denies recent travel, birth-control, smoking, headaches, palpitations, sob, pnd, orthopnea exercise intolerance, n/v/d/c/abd pain, fever, chills, cramps, or any other pain.      CARDIAC TESTING   ECHO:    STRESS:    CATH:     ELECTROPHYSIOLOGY:     PAST MEDICAL HISTORY  No pertinent past medical history  Termination of pregnancy (fetus)  Vaginal delivery  Hypothyroidism affecting pregnancy in third trimester  Diet controlled gestational diabetes mellitus (GDM) in third trimester    PAST SURGICAL HISTORY  No significant past surgical history    SOCIAL HISTORY:  Denies smoking/alcohol/drugs  CIGARETTES:     ALCOHOL:  DRUGS:    FAMILY HISTORY:  Father with MI age 38 unknowns if required stents or bypass surgery  Grandfather with MI age 40 followed up by Bypass x 4  Family History of Cardiovascular Disease:  Yes [ x ] No [  ]  Coronary Artery Disease in first degree relative: Yes [x  ] No [  ]  Sudden Cardiac Death in First degree relative: Yes [  ] No [ x ]    HOME MEDICATIONS:  Levothyroxine    CURRENT CARDIAC MEDICATIONS:  NOne    CURRENT OTHER MEDICATIONS:  Bactrim XL po bid    ALLERGIES:   bee pollen (Anaphylaxis)  bee-sting (Anaphylaxis; Hives)  shellfish (Anaphylaxis)    REVIEW OF SYMPTOMS:   CONSTITUTIONAL: No fever, no chills, no weight loss, no weight gain, no fatigue   ENMT:  No vertigo; No sinus or throat pain  NECK: No pain or stiffness  CARDIOVASCULAR: No chest pain, no dyspnea, no syncope/presyncope, no palpitations, no dizziness, no Orthopnea, no Paroxsymal nocturnal dyspnea  RESPIRATORY: no Shortness of breath, no cough, no wheezing  : No dysuria, no hematuria   GI: No dark color stool, no nausea, no diarrhea, no constipation, no abdominal pain   NEURO: No headache, no slurred speech   MUSCULOSKELETAL: No joint pain or swelling; No muscle, back, or extremity pain  PSYCH: No agitation, no anxiety.    ALL OTHER REVIEW OF SYSTEMS ARE NEGATIVE.    VITAL SIGNS:  T(C): 36.8 (10-18-24 @ 11:13), Max: 36.8 (10-18-24 @ 11:13)  T(F): 98.2 (10-18-24 @ 11:13), Max: 98.2 (10-18-24 @ 11:13)  HR: 84 (10-18-24 @ 11:13) (84 - 84)  BP: 127/86 (10-18-24 @ 11:13) (127/86 - 127/86)  RR: 18 (10-18-24 @ 11:13) (18 - 18)  SpO2: 100% (10-18-24 @ 11:13) (100% - 100%)    INTAKE AND OUTPUT:     PHYSICAL EXAM:  Constitutional: Comfortable . No acute distress.   HEENT: Atraumatic and normocephalic , neck is supple . no JVD. No carotid bruit.  CNS: A&Ox3. No focal deficits.   Respiratory: CTAB, unlabored   Cardiovascular: RRR normal s1 s2. No murmur. No rubs or gallop.  Gastrointestinal: Soft, non-tender. +Bowel sounds.   Extremities: 2+ Peripheral Pulses, No clubbing, cyanosis, or edema  Psychiatric: Calm . no agitation.   Skin: Warm and dry, no ulcers on extremities     LABS:                        12.6   7.99  )-----------( 252      ( 18 Oct 2024 12:00 )             37.1     10-18    134[L]  |  99  |  13.2  ----------------------------<  93  3.8   |  23.0  |  0.65    Ca    9.5      18 Oct 2024 12:00    TPro  7.8  /  Alb  4.5  /  TBili  0.4  /  DBili  x   /  AST  19  /  ALT  11  /  AlkPhos  75  10-18    PT/INR - ( 18 Oct 2024 12:00 )   PT: 10.9 sec;   INR: 0.96 ratio         PTT - ( 18 Oct 2024 12:00 )  PTT:35.3 sec  Urinalysis Basic - ( 18 Oct 2024 12:00 )    Color: x / Appearance: x / SG: x / pH: x  Gluc: 93 mg/dL / Ketone: x  / Bili: x / Urobili: x   Blood: x / Protein: x / Nitrite: x   Leuk Esterase: x / RBC: x / WBC x   Sq Epi: x / Non Sq Epi: x / Bacteria: x    INTERPRETATION OF TELEMETRY:  St no acute alarms noted      ECG:   Normal sinus rhythm Normal ECG No previous ECGs available  Prior ECG: Yes [  ] No [ x ]

## 2024-10-18 NOTE — CONSULT NOTE ADULT - ASSESSMENT
33yo female PMH:  Non cardiac, gestational diabetes, hashimoto's, and family history of MI in father and grandfather under 40.   Patient developed upper right/midsternal chest pain 4 days ago, pain has steadily increased, started at 4 and now is up to an 8, not relieved, non reproducible, and legs are numb, no other associated symptoms.  Patient states she went to an outpatient clinic 2 weeks ago for lightheadedness, which has continued until today.    Patient is also being treated for a urinary tract infection.   for  Patient denies recent travel, birth-control, smoking, headaches, palpitations, sob, pnd, orthopnea exercise intolerance, n/v/d/c/abd pain, fever, chills, cramps, or any other pain.

## 2024-10-18 NOTE — ED CDU PROVIDER INITIAL DAY NOTE - OBJECTIVE STATEMENT
33 y/o F pt w/ PMHx of Hypothyroid, FMHx of MI in father and grandfather in early 30s presenting w/ L sided chest pain intermittently. nonexertional. Trop negative x1. Cardiology evaluated patient and recommending serial troponins, echo versus cardiac CT.

## 2024-10-18 NOTE — ED ADULT NURSE NOTE - OBJECTIVE STATEMENT
Aox4. Pt presenting to Emergency Department complaining of L sided chest pain x 1 week. Pt reports last night the pain began to get worse with bilateral leg numbness. Denies chest pain, abd pain, back pain, headaches, dizziness, lightheadedness, fevers, chills, nausea, vomiting, diarrhea, constipation and dysuria.

## 2024-10-18 NOTE — ED CDU PROVIDER INITIAL DAY NOTE - ATTENDING CONTRIBUTION TO CARE
33 y/o F pt w/ PMHx of Hypothyroid, FMHx of MI in father and grandfather in early 30s presenting w/ L sided chest pain intermittently. nonexertional. Trop negative x1. Cardiology evaluated patient and recommending serial troponins, echo versus cardiac CT. Patient with episodes of lightheadedness with change in position. Patient currently on antibiotics for management of an acute UTI. She was on Macrobid  for a couple of days but recently was changed to Bactrim DS.  Patient states the past day she has noted numbness and heaviness but without neurologic symptoms.    Gen: Alert, NAD  Head: NC, AT, PERRL, EOMI, normal lids/conjunctiva  ENT: normal hearing, patent oropharynx without erythema/exudate, uvula midline  Neck: +supple, no tenderness/meningismus/JVD, +Trachea midline  Pulm: normal bilateral BS, normal resp effort, no wheeze/stridor/retractions  CV: RRR, no murmur  Abd: soft, NT/ND, +BS  Mskel: no gross deformity/ edema/erythema/cyanosis  Skin: no rash  Neuro: AAOx3, no gross sensory/motor deficits, CN 2-12 intact    I, Doyle Ghosh, personally saw the patient with the resident, and completed the key components of the history and physical exam. I then discussed the management plan with the resident.

## 2024-10-18 NOTE — ED CDU PROVIDER DISPOSITION NOTE - NSFOLLOWUPCLINICS_GEN_ALL_ED_FT
Cardiology at [place] ([facility])  Cardiology  [address], [address]  [place] [address]  Phone: [phone]  Fax:

## 2024-10-18 NOTE — CONSULT NOTE ADULT - NS ATTEND AMEND GEN_ALL_CORE FT
35yo female PMH:  Non cardiac, gestational diabetes, hashimoto's, and family history of MI in father and grandfather under 40.   Patient developed upper right/midsternal chest pain 4 days ago, pain has steadily increased, started at 4 and now is up to an 8, not relieved, non reproducible, and legs are numb, no other associated symptoms.  Patient states she went to an outpatient clinic 2 weeks ago for lightheadedness, which has continued until today.  Patient is also being treated for a urinary tract infection.   for  Patient denies recent travel, birth-control, smoking, headaches, palpitations, sob, pnd, orthopnea exercise intolerance, n/v/d/c/abd pain, fever, chills, cramps, or any other pain.     --TTE Left ventricular cavity is normal in size. Left ventricular systolic function is normal with an ejection fraction of 58 % by Romero's method of disks. Normal left ventricular diastolic function. Normal right ventricular cavity size and normal right ventricular systolic function. Normal left and right atrial size. No significant valvular disease. No pericardial effusion seen. 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 Since this is a very focal bridge, I think we can wait on starting medications. If she develops recurrent symptoms felt to be related to the bridge, then we can consider a BB    --She will need outpatient cardiology follow up.    --No further cardiac workup needed at this time.

## 2024-10-18 NOTE — ED CDU PROVIDER DISPOSITION NOTE - PATIENT PORTAL LINK FT
You can access the FollowMyHealth Patient Portal offered by [facility] by registering at the following website: [email or web address]. By joining [employer]’s FollowMyHealth portal, you will also be able to view your health information using other applications (apps) compatible with our system.

## 2024-10-18 NOTE — CONSULT NOTE ADULT - PROBLEM SELECTOR RECOMMENDATION 9
35 yo presents with chest pain, non smoker, + significant family history at young age, midsternal 8/10 as above.   EKG with no acute change noted  Patient went to outpatient clinc 1 week ago for lightheadedness, stated the PA referred her to outpatient cardiology due to TWI (does not know leads and does not have a copy).  Patient has a scheduled outpatient appointment for next Monday.    Trop negative < 6 x 1 - will repeat  EKG with poor data quality - will repeat  TTE pending as ordered by ED

## 2024-10-18 NOTE — CONSULT NOTE ADULT - CONSULT REQUESTED BY NAME
[de-identified] : Assessment & Plan: The patient is approximately 1 week postoperative. Sutures removed and Steri Strips applied today. The patient is instructed in wound management. The patient's post-op plan, protocol and activity modifications have been thoroughly discussed and the patient expressed understanding. The patient will control pain as discussed & continue ice and elevation as needed. The patient otherwise may advance activity as discussed.   Arthroscopy photos were reviewed in great detail.   Prescription Medications Ordered: Tylenol and Ibuprofen   Physical Therapy: [Start per protocol, new prescription given today, continue home exercise program]   Activity/Work/Sports Status: [Out of work/gym/sports]  Considering ongoing pain in RT knee, MRI ordered to eval for MMT   Follow-Up: [4 weeks]
/ Linda

## 2024-10-18 NOTE — ED CDU PROVIDER DISPOSITION NOTE - NSFOLLOWUPINSTRUCTIONS_ED_ALL_ED_FT
-Please follow-up with your primary care doctor in the next 2 days.  Please call tomorrow for an appointment.  If you cannot follow-up with your primary care doctor please return to the ED for any urgent issues.  - You were given a copy of the tests performed today.  Please bring the results with you and review them with your primary care doctor.  - If you have any worsening of symptoms or any other concerns please return to the ED immediately.  - Please continue taking your home medications as directed.    Chest Pain    Chest pain can be caused by many different conditions which may or may not be dangerous. Causes include heartburn, lung infections, heart attack, blood clot in lungs, skin infections, strain or damage to muscle, cartilage, or bones, etc. In addition to a history and physical examination, an electrocardiogram (ECG) or other lab tests may have been performed to determine the cause of your chest pain. Follow up with your primary care provider or with a cardiologist as instructed.     SEEK IMMEDIATE MEDICAL CARE IF YOU HAVE ANY OF THE FOLLOWING SYMPTOMS: worsening chest pain, coughing up blood, unexplained back/neck/jaw pain, severe abdominal pain, dizziness or lightheadedness, fainting, shortness of breath, sweaty or clammy skin, vomiting, or racing heart beat. These symptoms may represent a serious problem that is an emergency. Do not wait to see if the symptoms will go away. Get medical help right away. Call 911 and do not drive yourself to the hospital.

## 2024-10-18 NOTE — ED PROVIDER NOTE - CLINICAL SUMMARY MEDICAL DECISION MAKING FREE TEXT BOX
labs and diagnostic imaging results reviewed with patient; cardiology assessment noted labs and diagnostic imaging results reviewed with patient; cardiology assessment noted; place in observation for further work up

## 2024-10-18 NOTE — ED CDU PROVIDER DISPOSITION NOTE - CLINICAL COURSE
Pt w/ pmhx of hypothyroid and FMHx of MI in early 30s presented w/ chest pain. cardiology recommending serial trop, echo versus cardiac CT. Echo and cardiac CT without acute abnormality. Cleared by cardiology for DC w/ outpatient f/u on Monday.

## 2024-10-18 NOTE — ED CDU PROVIDER DISPOSITION NOTE - ATTENDING CONTRIBUTION TO CARE
34-year-old female  presents with chest pain.  Patient has strong family history of MIs in early 30s.  Serial Trop <6 x 2.  Echo unremarkable.  Patient had cardiac CTA with 0 calcium score.  Symptoms improved.  Outpatient follow-up.

## 2024-10-18 NOTE — CONSULT NOTE ADULT - TIME BILLING
Non cardiac, gestational diabetes, hashimoto's, and family history of MI in father and grandfather under 40.  chest pain  ecg review

## 2024-10-19 LAB
HCV AB S/CO SERPL IA: 0.13 S/CO — SIGNIFICANT CHANGE UP (ref 0–0.99)
HCV AB SERPL-IMP: SIGNIFICANT CHANGE UP

## 2024-10-21 LAB
CULTURE RESULTS: NO GROWTH — SIGNIFICANT CHANGE UP
SPECIMEN SOURCE: SIGNIFICANT CHANGE UP

## 2024-10-24 ENCOUNTER — OUTPATIENT (OUTPATIENT)
Dept: OUTPATIENT SERVICES | Facility: HOSPITAL | Age: 34
LOS: 1 days | End: 2024-10-24

## 2024-10-24 ENCOUNTER — APPOINTMENT (OUTPATIENT)
Dept: ULTRASOUND IMAGING | Facility: CLINIC | Age: 34
End: 2024-10-24

## 2024-10-24 DIAGNOSIS — E06.3 AUTOIMMUNE THYROIDITIS: ICD-10-CM

## 2024-10-24 PROCEDURE — 76536 US EXAM OF HEAD AND NECK: CPT | Mod: 26

## 2024-10-31 ENCOUNTER — APPOINTMENT (OUTPATIENT)
Dept: ENDOCRINOLOGY | Facility: CLINIC | Age: 34
End: 2024-10-31
Payer: COMMERCIAL

## 2024-10-31 VITALS
BODY MASS INDEX: 21.71 KG/M2 | HEART RATE: 90 BPM | SYSTOLIC BLOOD PRESSURE: 120 MMHG | HEIGHT: 62 IN | WEIGHT: 118 LBS | DIASTOLIC BLOOD PRESSURE: 76 MMHG | OXYGEN SATURATION: 99 %

## 2024-10-31 DIAGNOSIS — E04.1 NONTOXIC SINGLE THYROID NODULE: ICD-10-CM

## 2024-10-31 DIAGNOSIS — E06.3 AUTOIMMUNE THYROIDITIS: ICD-10-CM

## 2024-10-31 PROCEDURE — 99214 OFFICE O/P EST MOD 30 MIN: CPT

## 2024-10-31 PROCEDURE — G2211 COMPLEX E/M VISIT ADD ON: CPT | Mod: NC

## 2024-11-01 ENCOUNTER — NON-APPOINTMENT (OUTPATIENT)
Age: 34
End: 2024-11-01

## 2025-01-08 ENCOUNTER — NON-APPOINTMENT (OUTPATIENT)
Age: 35
End: 2025-01-08

## 2025-01-27 ENCOUNTER — APPOINTMENT (OUTPATIENT)
Dept: OBGYN | Facility: CLINIC | Age: 35
End: 2025-01-27

## 2025-01-27 VITALS
SYSTOLIC BLOOD PRESSURE: 112 MMHG | BODY MASS INDEX: 22.08 KG/M2 | WEIGHT: 120 LBS | HEIGHT: 62 IN | DIASTOLIC BLOOD PRESSURE: 68 MMHG

## 2025-01-27 DIAGNOSIS — Z12.39 ENCOUNTER FOR OTHER SCREENING FOR MALIGNANT NEOPLASM OF BREAST: ICD-10-CM

## 2025-01-27 DIAGNOSIS — Z12.4 ENCOUNTER FOR SCREENING FOR MALIGNANT NEOPLASM OF CERVIX: ICD-10-CM

## 2025-01-27 DIAGNOSIS — R92.30 DENSE BREASTS, UNSPECIFIED: ICD-10-CM

## 2025-01-27 PROCEDURE — 99395 PREV VISIT EST AGE 18-39: CPT

## 2025-01-30 LAB
CYTOLOGY CVX/VAG DOC THIN PREP: NORMAL
HPV HIGH+LOW RISK DNA PNL CVX: NOT DETECTED

## 2025-02-14 ENCOUNTER — APPOINTMENT (OUTPATIENT)
Dept: ULTRASOUND IMAGING | Facility: CLINIC | Age: 35
End: 2025-02-14

## 2025-02-14 ENCOUNTER — OUTPATIENT (OUTPATIENT)
Dept: OUTPATIENT SERVICES | Facility: HOSPITAL | Age: 35
LOS: 1 days | End: 2025-02-14
Payer: COMMERCIAL

## 2025-02-14 ENCOUNTER — APPOINTMENT (OUTPATIENT)
Dept: MAMMOGRAPHY | Facility: CLINIC | Age: 35
End: 2025-02-14
Payer: COMMERCIAL

## 2025-02-14 ENCOUNTER — RESULT REVIEW (OUTPATIENT)
Age: 35
End: 2025-02-14

## 2025-02-14 DIAGNOSIS — R92.8 OTHER ABNORMAL AND INCONCLUSIVE FINDINGS ON DIAGNOSTIC IMAGING OF BREAST: ICD-10-CM

## 2025-02-14 PROCEDURE — 77063 BREAST TOMOSYNTHESIS BI: CPT | Mod: 26

## 2025-02-14 PROCEDURE — 77067 SCR MAMMO BI INCL CAD: CPT | Mod: 26

## 2025-02-14 PROCEDURE — 76641 ULTRASOUND BREAST COMPLETE: CPT

## 2025-02-14 PROCEDURE — 77063 BREAST TOMOSYNTHESIS BI: CPT

## 2025-02-14 PROCEDURE — 77067 SCR MAMMO BI INCL CAD: CPT

## 2025-02-14 PROCEDURE — 76641 ULTRASOUND BREAST COMPLETE: CPT | Mod: 26,50

## 2025-02-17 DIAGNOSIS — Z91.89 OTHER SPECIFIED PERSONAL RISK FACTORS, NOT ELSEWHERE CLASSIFIED: ICD-10-CM

## 2025-03-19 ENCOUNTER — EMERGENCY (EMERGENCY)
Facility: HOSPITAL | Age: 35
LOS: 1 days | Discharge: HOME | End: 2025-03-19
Attending: STUDENT IN AN ORGANIZED HEALTH CARE EDUCATION/TRAINING PROGRAM
Payer: COMMERCIAL

## 2025-03-19 VITALS
OXYGEN SATURATION: 98 % | TEMPERATURE: 99 F | SYSTOLIC BLOOD PRESSURE: 108 MMHG | HEART RATE: 98 BPM | DIASTOLIC BLOOD PRESSURE: 69 MMHG | RESPIRATION RATE: 18 BRPM

## 2025-03-19 VITALS
SYSTOLIC BLOOD PRESSURE: 118 MMHG | RESPIRATION RATE: 17 BRPM | OXYGEN SATURATION: 99 % | DIASTOLIC BLOOD PRESSURE: 75 MMHG | TEMPERATURE: 98 F | WEIGHT: 121.47 LBS | HEART RATE: 94 BPM

## 2025-03-19 LAB
ALBUMIN SERPL ELPH-MCNC: 4.9 G/DL — SIGNIFICANT CHANGE UP (ref 3.3–5.2)
ALP SERPL-CCNC: 69 U/L — SIGNIFICANT CHANGE UP (ref 40–120)
ALT FLD-CCNC: 11 U/L — SIGNIFICANT CHANGE UP
ANION GAP SERPL CALC-SCNC: 10 MMOL/L — SIGNIFICANT CHANGE UP (ref 5–17)
APPEARANCE UR: CLEAR — SIGNIFICANT CHANGE UP
AST SERPL-CCNC: 18 U/L — SIGNIFICANT CHANGE UP
BACTERIA # UR AUTO: NEGATIVE /HPF — SIGNIFICANT CHANGE UP
BASOPHILS # BLD AUTO: 0.03 K/UL — SIGNIFICANT CHANGE UP (ref 0–0.2)
BASOPHILS NFR BLD AUTO: 0.4 % — SIGNIFICANT CHANGE UP (ref 0–2)
BILIRUB SERPL-MCNC: 0.4 MG/DL — SIGNIFICANT CHANGE UP (ref 0.4–2)
BILIRUB UR-MCNC: NEGATIVE — SIGNIFICANT CHANGE UP
BUN SERPL-MCNC: 14.4 MG/DL — SIGNIFICANT CHANGE UP (ref 8–20)
CALCIUM SERPL-MCNC: 9.6 MG/DL — SIGNIFICANT CHANGE UP (ref 8.4–10.5)
CAST: 0 /LPF — SIGNIFICANT CHANGE UP (ref 0–4)
CHLORIDE SERPL-SCNC: 100 MMOL/L — SIGNIFICANT CHANGE UP (ref 96–108)
CO2 SERPL-SCNC: 27 MMOL/L — SIGNIFICANT CHANGE UP (ref 22–29)
COLOR SPEC: YELLOW — SIGNIFICANT CHANGE UP
CREAT SERPL-MCNC: 0.6 MG/DL — SIGNIFICANT CHANGE UP (ref 0.5–1.3)
DIFF PNL FLD: NEGATIVE — SIGNIFICANT CHANGE UP
EGFR: 119 ML/MIN/1.73M2 — SIGNIFICANT CHANGE UP
EGFR: 119 ML/MIN/1.73M2 — SIGNIFICANT CHANGE UP
EOSINOPHIL # BLD AUTO: 0.06 K/UL — SIGNIFICANT CHANGE UP (ref 0–0.5)
EOSINOPHIL NFR BLD AUTO: 0.7 % — SIGNIFICANT CHANGE UP (ref 0–6)
GLUCOSE SERPL-MCNC: 81 MG/DL — SIGNIFICANT CHANGE UP (ref 70–99)
GLUCOSE UR QL: NEGATIVE MG/DL — SIGNIFICANT CHANGE UP
HCG SERPL-ACNC: <4 MIU/ML — SIGNIFICANT CHANGE UP
HCT VFR BLD CALC: 38.8 % — SIGNIFICANT CHANGE UP (ref 34.5–45)
HGB BLD-MCNC: 13.1 G/DL — SIGNIFICANT CHANGE UP (ref 11.5–15.5)
IMM GRANULOCYTES # BLD AUTO: 0.04 K/UL — SIGNIFICANT CHANGE UP (ref 0–0.07)
IMM GRANULOCYTES NFR BLD AUTO: 0.5 % — SIGNIFICANT CHANGE UP (ref 0–0.9)
KETONES UR-MCNC: NEGATIVE MG/DL — SIGNIFICANT CHANGE UP
LEUKOCYTE ESTERASE UR-ACNC: ABNORMAL
LIDOCAIN IGE QN: 33 U/L — SIGNIFICANT CHANGE UP (ref 22–51)
LYMPHOCYTES # BLD AUTO: 1.32 K/UL — SIGNIFICANT CHANGE UP (ref 1–3.3)
LYMPHOCYTES NFR BLD AUTO: 15.5 % — SIGNIFICANT CHANGE UP (ref 13–44)
MAGNESIUM SERPL-MCNC: 2.1 MG/DL — SIGNIFICANT CHANGE UP (ref 1.6–2.6)
MCHC RBC-ENTMCNC: 31.5 PG — SIGNIFICANT CHANGE UP (ref 27–34)
MCHC RBC-ENTMCNC: 33.8 G/DL — SIGNIFICANT CHANGE UP (ref 32–36)
MCV RBC AUTO: 93.3 FL — SIGNIFICANT CHANGE UP (ref 80–100)
MONOCYTES # BLD AUTO: 0.5 K/UL — SIGNIFICANT CHANGE UP (ref 0–0.9)
MONOCYTES NFR BLD AUTO: 5.9 % — SIGNIFICANT CHANGE UP (ref 2–14)
NEUTROPHILS # BLD AUTO: 6.57 K/UL — SIGNIFICANT CHANGE UP (ref 1.8–7.4)
NEUTROPHILS NFR BLD AUTO: 77 % — SIGNIFICANT CHANGE UP (ref 43–77)
NITRITE UR-MCNC: NEGATIVE — SIGNIFICANT CHANGE UP
NRBC # BLD AUTO: 0 K/UL — SIGNIFICANT CHANGE UP (ref 0–0)
NRBC # FLD: 0 K/UL — SIGNIFICANT CHANGE UP (ref 0–0)
NRBC BLD AUTO-RTO: 0 /100 WBCS — SIGNIFICANT CHANGE UP (ref 0–0)
PH UR: 6.5 — SIGNIFICANT CHANGE UP (ref 5–8)
PLATELET # BLD AUTO: 238 K/UL — SIGNIFICANT CHANGE UP (ref 150–400)
PMV BLD: 9.3 FL — SIGNIFICANT CHANGE UP (ref 7–13)
POTASSIUM SERPL-MCNC: 3.8 MMOL/L — SIGNIFICANT CHANGE UP (ref 3.5–5.3)
POTASSIUM SERPL-SCNC: 3.8 MMOL/L — SIGNIFICANT CHANGE UP (ref 3.5–5.3)
PROT SERPL-MCNC: 7.9 G/DL — SIGNIFICANT CHANGE UP (ref 6.6–8.7)
PROT UR-MCNC: NEGATIVE MG/DL — SIGNIFICANT CHANGE UP
RBC # BLD: 4.16 M/UL — SIGNIFICANT CHANGE UP (ref 3.8–5.2)
RBC # FLD: 11.5 % — SIGNIFICANT CHANGE UP (ref 10.3–14.5)
RBC CASTS # UR COMP ASSIST: 0 /HPF — SIGNIFICANT CHANGE UP (ref 0–4)
SODIUM SERPL-SCNC: 137 MMOL/L — SIGNIFICANT CHANGE UP (ref 135–145)
SP GR SPEC: 1.01 — SIGNIFICANT CHANGE UP (ref 1–1.03)
SQUAMOUS # UR AUTO: 4 /HPF — SIGNIFICANT CHANGE UP (ref 0–5)
UROBILINOGEN FLD QL: 0.2 MG/DL — SIGNIFICANT CHANGE UP (ref 0.2–1)
WBC # BLD: 8.52 K/UL — SIGNIFICANT CHANGE UP (ref 3.8–10.5)
WBC # FLD AUTO: 8.52 K/UL — SIGNIFICANT CHANGE UP (ref 3.8–10.5)
WBC UR QL: 21 /HPF — HIGH (ref 0–5)

## 2025-03-19 PROCEDURE — 74177 CT ABD & PELVIS W/CONTRAST: CPT | Mod: MC

## 2025-03-19 PROCEDURE — 99284 EMERGENCY DEPT VISIT MOD MDM: CPT | Mod: 25

## 2025-03-19 PROCEDURE — 36415 COLL VENOUS BLD VENIPUNCTURE: CPT

## 2025-03-19 PROCEDURE — 74177 CT ABD & PELVIS W/CONTRAST: CPT | Mod: 26

## 2025-03-19 PROCEDURE — 84702 CHORIONIC GONADOTROPIN TEST: CPT

## 2025-03-19 PROCEDURE — 81001 URINALYSIS AUTO W/SCOPE: CPT

## 2025-03-19 PROCEDURE — 87086 URINE CULTURE/COLONY COUNT: CPT

## 2025-03-19 PROCEDURE — 96375 TX/PRO/DX INJ NEW DRUG ADDON: CPT | Mod: XU

## 2025-03-19 PROCEDURE — 80053 COMPREHEN METABOLIC PANEL: CPT

## 2025-03-19 PROCEDURE — 99285 EMERGENCY DEPT VISIT HI MDM: CPT

## 2025-03-19 PROCEDURE — 96374 THER/PROPH/DIAG INJ IV PUSH: CPT | Mod: XU

## 2025-03-19 PROCEDURE — 83735 ASSAY OF MAGNESIUM: CPT

## 2025-03-19 PROCEDURE — 85025 COMPLETE CBC W/AUTO DIFF WBC: CPT

## 2025-03-19 PROCEDURE — 83690 ASSAY OF LIPASE: CPT

## 2025-03-19 RX ORDER — ONDANSETRON HCL/PF 4 MG/2 ML
4 VIAL (ML) INJECTION ONCE
Refills: 0 | Status: COMPLETED | OUTPATIENT
Start: 2025-03-19 | End: 2025-03-19

## 2025-03-19 RX ORDER — CEPHALEXIN 250 MG/1
1 CAPSULE ORAL
Qty: 14 | Refills: 0
Start: 2025-03-19 | End: 2025-03-25

## 2025-03-19 RX ORDER — CEPHALEXIN 250 MG/1
500 CAPSULE ORAL ONCE
Refills: 0 | Status: COMPLETED | OUTPATIENT
Start: 2025-03-19 | End: 2025-03-19

## 2025-03-19 RX ORDER — ACETAMINOPHEN 500 MG/5ML
1000 LIQUID (ML) ORAL ONCE
Refills: 0 | Status: COMPLETED | OUTPATIENT
Start: 2025-03-19 | End: 2025-03-19

## 2025-03-19 RX ORDER — MAGNESIUM, ALUMINUM HYDROXIDE 200-200 MG
30 TABLET,CHEWABLE ORAL ONCE
Refills: 0 | Status: COMPLETED | OUTPATIENT
Start: 2025-03-19 | End: 2025-03-19

## 2025-03-19 RX ADMIN — Medication 30 MILLILITER(S): at 10:17

## 2025-03-19 RX ADMIN — Medication 4 MILLIGRAM(S): at 10:17

## 2025-03-19 RX ADMIN — Medication 1000 MILLILITER(S): at 10:17

## 2025-03-19 RX ADMIN — Medication 1000 MILLIGRAM(S): at 11:05

## 2025-03-19 RX ADMIN — CEPHALEXIN 500 MILLIGRAM(S): 250 CAPSULE ORAL at 13:37

## 2025-03-19 RX ADMIN — Medication 400 MILLIGRAM(S): at 10:17

## 2025-03-19 RX ADMIN — Medication 20 MILLIGRAM(S): at 10:17

## 2025-03-19 NOTE — ED PROVIDER NOTE - CLINICAL SUMMARY MEDICAL DECISION MAKING FREE TEXT BOX
35-year-old female history of hypothyroidism presenting to the ED complaining of diffuse abdominal pain, constipation and nausea x 3 days. Labs reviewed.  UA positive for infection, will treat with antibiotics.  Given that patient was still in pain despite medication–CT scan was performed that shows some urinary wall thickening with no other acute pathology noted.  Will treat for UTI, patient stable for discharge return precautions discussed.

## 2025-03-19 NOTE — ED ADULT NURSE NOTE - OBJECTIVE STATEMENT
34 Yo female presented to the Ed with c/o diffuse abdominal pain since 2 days associated with nausea and decreased appetite. intermittent pain aggravated with movement. Denies diarrhea, vomiting, fever, chills, dizziness, chest pain, SOB  or headache. blood work , urine sample sent, awaiting results.

## 2025-03-19 NOTE — ED PROVIDER NOTE - OBJECTIVE STATEMENT
35-year-old female history of hypothyroidism presenting to the ED complaining of diffuse abdominal pain, constipation and nausea x 3 days. Pt otherwise denies fever/chills, c/p, sob, v/d, dysuria, numbness/tingling/weakness and has no other complaints at this time.

## 2025-03-19 NOTE — ED PROVIDER NOTE - PATIENT PORTAL LINK FT
You can access the FollowMyHealth Patient Portal offered by [facility] by registering at the following website: [email or web address]. By joining [employer]’s FollowMyHealth portal, you will also be able to view your health information using other applications (apps) compatible with our system.

## 2025-03-19 NOTE — ED PROVIDER NOTE - SKIN, MLM
3/18/2022      RE: Smitha Seaman  [address] [id number]       See previous note        Poonam Hassan MD     Skin normal color for race, warm, dry and intact. No evidence of rash.

## 2025-03-19 NOTE — ED PROVIDER NOTE - ATTENDING APP SHARED VISIT CONTRIBUTION OF CARE
I, Bartolo Thurston MD, have seen and examined the patient on the date of service.  I agree with the BOB's assessment as written, with exceptions or additions as noted below or in a separate note.  Patient with no significant past medical history other than prior appendectomy is presenting for mid abdominal pain.  States that her symptoms have been present for the past 2 days and they have been constant.  Endorsing nausea with this.  States located in the center of her abdomen and does not radiate.  Symptoms are worse with eating.  Has had decreased bowel movements during this time but is still passing gas.  No vomiting.  States has had a prior ovarian cyst rupture but this feels different.  Denies dysuria or polyuria.  On physical exam patient has pain over the center of her abdomen with no right lower quadrant, left lower quadrant or right upper quadrant pain.  With her nausea and central abdominal pain, discussed possibility of gastritis versus pancreatitis versus enteritis.  No lower abdominal pain to suggest ovarian pathology such as torsion versus cyst.  Will check for pregnancy.  No right upper quadrant pain to suggest biliary colic.  Will check labs, treat symptomatically and reassess for potential imaging based on workup and how she does clinically.

## 2025-03-19 NOTE — ED PROVIDER NOTE - CARDIAC, MLM
Detail Level: Detailed Normal rate, regular rhythm.  Heart sounds S1, S2.  No murmurs, rubs or gallops.

## 2025-03-20 LAB
CULTURE RESULTS: NO GROWTH — SIGNIFICANT CHANGE UP
SPECIMEN SOURCE: SIGNIFICANT CHANGE UP

## 2025-03-21 DIAGNOSIS — N39.0 URINARY TRACT INFECTION, SITE NOT SPECIFIED: ICD-10-CM

## 2025-03-21 DIAGNOSIS — E03.9 HYPOTHYROIDISM, UNSPECIFIED: ICD-10-CM

## 2025-05-21 ENCOUNTER — OUTPATIENT (OUTPATIENT)
Dept: OUTPATIENT SERVICES | Facility: HOSPITAL | Age: 35
LOS: 1 days | End: 2025-05-21

## 2025-05-21 ENCOUNTER — APPOINTMENT (OUTPATIENT)
Dept: ULTRASOUND IMAGING | Facility: CLINIC | Age: 35
End: 2025-05-21
Payer: COMMERCIAL

## 2025-05-21 DIAGNOSIS — Z00.8 ENCOUNTER FOR OTHER GENERAL EXAMINATION: ICD-10-CM

## 2025-05-21 PROCEDURE — 76536 US EXAM OF HEAD AND NECK: CPT | Mod: 26

## 2025-05-27 ENCOUNTER — APPOINTMENT (OUTPATIENT)
Dept: ENDOCRINOLOGY | Facility: CLINIC | Age: 35
End: 2025-05-27
Payer: COMMERCIAL

## 2025-05-27 VITALS
OXYGEN SATURATION: 99 % | WEIGHT: 121 LBS | DIASTOLIC BLOOD PRESSURE: 70 MMHG | BODY MASS INDEX: 22.26 KG/M2 | HEIGHT: 62 IN | HEART RATE: 89 BPM | SYSTOLIC BLOOD PRESSURE: 114 MMHG

## 2025-05-27 DIAGNOSIS — E04.1 NONTOXIC SINGLE THYROID NODULE: ICD-10-CM

## 2025-05-27 DIAGNOSIS — E06.3 AUTOIMMUNE THYROIDITIS: ICD-10-CM

## 2025-05-27 PROCEDURE — G2211 COMPLEX E/M VISIT ADD ON: CPT | Mod: NC

## 2025-05-27 PROCEDURE — 99214 OFFICE O/P EST MOD 30 MIN: CPT

## 2025-07-28 NOTE — ED ADULT TRIAGE NOTE - CADM TRG TX PRIOR TO ARRIVAL
PACU Transfer Note    Vitals:    07/28/25 1630   BP: 131/75   Pulse: 69   Resp: 15   Temp: 97.8 °F (36.6 °C)   SpO2: 95%       In: 1850 [P.O.:150; I.V.:1700]  Out: 635 [Urine:610]    Pain assessment:  receiving treatment  Pain Level: 8    Report given to Receiving unit RN.    7/28/2025 5:06 PM        none

## 2025-08-01 ENCOUNTER — APPOINTMENT (OUTPATIENT)
Dept: MRI IMAGING | Facility: CLINIC | Age: 35
End: 2025-08-01

## 2025-08-27 ENCOUNTER — NON-APPOINTMENT (OUTPATIENT)
Age: 35
End: 2025-08-27

## 2025-09-15 ENCOUNTER — NON-APPOINTMENT (OUTPATIENT)
Age: 35
End: 2025-09-15